# Patient Record
Sex: MALE | Race: WHITE | NOT HISPANIC OR LATINO | Employment: FULL TIME | URBAN - METROPOLITAN AREA
[De-identification: names, ages, dates, MRNs, and addresses within clinical notes are randomized per-mention and may not be internally consistent; named-entity substitution may affect disease eponyms.]

---

## 2017-01-23 ENCOUNTER — ALLSCRIPTS OFFICE VISIT (OUTPATIENT)
Dept: OTHER | Facility: OTHER | Age: 57
End: 2017-01-23

## 2017-01-23 LAB
CLARITY UR: NORMAL
COLOR UR: YELLOW
GLUCOSE (HISTORICAL): NORMAL
HGB UR QL STRIP.AUTO: NORMAL
KETONES UR STRIP-MCNC: NORMAL MG/DL
LEUKOCYTE ESTERASE UR QL STRIP: NORMAL
NITRITE UR QL STRIP: NORMAL
PH UR STRIP.AUTO: 7 [PH]
PROT UR STRIP-MCNC: NORMAL MG/DL
SP GR UR STRIP.AUTO: 1

## 2017-07-24 ENCOUNTER — ALLSCRIPTS OFFICE VISIT (OUTPATIENT)
Dept: OTHER | Facility: OTHER | Age: 57
End: 2017-07-24

## 2017-07-24 LAB
CLARITY UR: NORMAL
COLOR UR: YELLOW
GLUCOSE (HISTORICAL): NORMAL
KETONES UR STRIP-MCNC: NORMAL MG/DL
LEUKOCYTE ESTERASE UR QL STRIP: NORMAL
PH UR STRIP.AUTO: 5 [PH]
PROT UR STRIP-MCNC: NORMAL MG/DL
SP GR UR STRIP.AUTO: 1.01
UROBILINOGEN UR QL STRIP.AUTO: NORMAL

## 2018-01-13 VITALS
WEIGHT: 220 LBS | HEART RATE: 79 BPM | DIASTOLIC BLOOD PRESSURE: 80 MMHG | BODY MASS INDEX: 27.35 KG/M2 | SYSTOLIC BLOOD PRESSURE: 138 MMHG | HEIGHT: 75 IN

## 2018-01-15 VITALS
WEIGHT: 223 LBS | HEART RATE: 64 BPM | DIASTOLIC BLOOD PRESSURE: 90 MMHG | BODY MASS INDEX: 27.73 KG/M2 | HEIGHT: 75 IN | SYSTOLIC BLOOD PRESSURE: 130 MMHG

## 2018-01-19 ENCOUNTER — ALLSCRIPTS OFFICE VISIT (OUTPATIENT)
Dept: OTHER | Facility: OTHER | Age: 58
End: 2018-01-19

## 2018-01-20 NOTE — PROGRESS NOTES
Assessment   1  Right inguinal hernia (550 90) (K40 90)    Plan   Right inguinal hernia    · 1 - Emery GAN, Eder Mccann (General Surgery) Co-Management  *  Status: Active     Requested for: 77ACL2486  Care Summary provided  : Yes      I do suspect the patient has a hernia-I have referred him to General surgery for consultation, confirmation of the diagnosis, and likely repair  Discussion/Summary   Possible side effects of new medications were reviewed with the patient/guardian today  The treatment plan was reviewed with the patient/guardian  The patient/guardian understands and agrees with the treatment plan      Chief Complaint   Pt here with what he believes is a hernia  He is just getting over a cough and seems to have some rebound, discomfort in his groin area  History of Present Illness   HPI: here today because he thinks he has a hernia on the left lower abdomen had a bad cough and started to feel a discomfort in the left lower abdomen noticed a bulge is not painful does go in and out has never had a hernia before      Active Problems   1  Benign nodular prostatic hyperplasia without lower urinary tract symptoms (600 10)     (N40 0)   2  Carotid bruit (785 9) (R09 89)   3  Enlarged prostate (600 00) (N40 0)   4  Hypertension (401 9) (I10)    Past Medical History   1  History of hematuria (V13 09) (Z87 448)   2  History of urinary frequency (V13 09) (K77 784)  Active Problems And Past Medical History Reviewed: The active problems and past medical history were reviewed and updated today  Family History   Mother    1  Family history of Hypertension (V17 49)  Father    2  Family history of CABG   3  Family history of cardiac disorder (V17 49) (Z82 49)   4  Family history of Hyperlipidemia  Sister    5  Family history of Breast Cancer (V16 3)  Maternal Grandmother    6  Family history of Diabetes Mellitus (V18 0)  Family History Reviewed: The family history was reviewed and updated today  Social History    · Denied: History of Drug Use   ·    · Never a smoker   · Never A Smoker   · Never Drank Alcohol   · No alcohol use   · No drug use  The social history was reviewed and updated today  Surgical History   Surgical History Reviewed: The surgical history was reviewed and updated today  Current Meds    1  Finasteride 5 MG Oral Tablet; Take 1 tablet daily; Therapy: [id number] to (Evaluate:18Jan2018)  Requested for: 04AFG0817; Last     Rx:23Jan2017 Ordered   2  Oxybutynin Chloride ER 10 MG Oral Tablet Extended Release 24 Hour; TAKE 1 TABLET     DAILY; Therapy: 20ODL5866 to (Evaluate:18Jan2018)  Requested for: 66HXP0194; Last     Rx:23Jan2017 Ordered   3  Tamsulosin HCl - 0 4 MG Oral Capsule; TAKE 1 CAPSULE Bedtime; Therapy: 33PZO6530 to 0688 [id number] 05 65)  Requested for: 83JXH0927; Last     Rx:23Jan2017 Ordered     The medication list was reviewed and updated today  Allergies   1  No Known Drug Allergies  2  No Known Environmental Allergies   3  No Known Food Allergies    Vitals    Recorded: 35LQY3391 01:38PM   Temperature 96 5 F   Heart Rate 72   Systolic 459   Diastolic 88   Height 6 ft 3 in   Weight 218 lb    BMI Calculated 27 25   BSA Calculated 2 28   O2 Saturation 98     Physical Exam        Constitutional      General appearance: No acute distress, well appearing and well nourished  Musculoskeletal      Gait and station: Normal        Additional Exam:  There is a bulge in the right lower abdomen/inguinal area that is more pronounced with cough, no tenderness to palpation, no strangulation, it is reducible  Future Appointments      Date/Time Provider Specialty Site   07/30/2018 07:30 AM PINKY Sr   Urology [address] [place] [address]     Signatures    Electronically signed by : PINKY Whiteside ; Jan 19 2018  1:55PM EST                       (Author)

## 2018-01-22 VITALS
SYSTOLIC BLOOD PRESSURE: 132 MMHG | WEIGHT: 218 LBS | HEART RATE: 72 BPM | HEIGHT: 75 IN | TEMPERATURE: 96.5 F | OXYGEN SATURATION: 98 % | DIASTOLIC BLOOD PRESSURE: 88 MMHG | BODY MASS INDEX: 27.1 KG/M2

## 2018-01-29 ENCOUNTER — OFFICE VISIT (OUTPATIENT)
Dept: SURGERY | Facility: HOSPITAL | Age: 58
End: 2018-01-29
Payer: COMMERCIAL

## 2018-01-29 VITALS
DIASTOLIC BLOOD PRESSURE: 90 MMHG | HEIGHT: 75 IN | SYSTOLIC BLOOD PRESSURE: 148 MMHG | BODY MASS INDEX: 26.11 KG/M2 | WEIGHT: 210 LBS | HEART RATE: 80 BPM | RESPIRATION RATE: 20 BRPM | TEMPERATURE: 97.6 F

## 2018-01-29 DIAGNOSIS — K40.90 RIGHT INGUINAL HERNIA: Primary | ICD-10-CM

## 2018-01-29 DIAGNOSIS — D22.9 MULTIPLE PIGMENTED NEVI: ICD-10-CM

## 2018-01-29 DIAGNOSIS — D18.01 HEMANGIOMA OF SKIN: ICD-10-CM

## 2018-01-29 DIAGNOSIS — K42.9 UMBILICAL HERNIA WITHOUT OBSTRUCTION AND WITHOUT GANGRENE: ICD-10-CM

## 2018-01-29 PROCEDURE — 99203 OFFICE O/P NEW LOW 30 MIN: CPT | Performed by: SURGERY

## 2018-01-29 RX ORDER — FINASTERIDE 5 MG/1
1 TABLET, FILM COATED ORAL DAILY
COMMUNITY
Start: 2016-11-14 | End: 2018-03-19 | Stop reason: SDUPTHER

## 2018-01-29 RX ORDER — TAMSULOSIN HYDROCHLORIDE 0.4 MG/1
1 CAPSULE ORAL
COMMUNITY
Start: 2017-01-23 | End: 2018-03-19 | Stop reason: SDUPTHER

## 2018-01-29 RX ORDER — OXYBUTYNIN CHLORIDE 10 MG/1
1 TABLET, EXTENDED RELEASE ORAL DAILY
COMMUNITY
Start: 2016-11-14 | End: 2018-03-19 | Stop reason: SDUPTHER

## 2018-01-29 NOTE — PROGRESS NOTES
[address]  [facility]  [facility] [address]  1960  [address] [place] [address] SURGICAL Fonnie Hermelinda  [facility]  [address] [address]    Chief Complaint   Patient presents with    Groin Pain     New patient consult for possible right inguinal hernia       Assessment/Plan          No history exists  History of Present Illness: Gabby Pike is a 59year old male who presents today, per referral by Dr Alvin Treadwell, for consultation regarding a possible right inguinal hernia  He noticed the bulge about a week ago  He recently had recent cold with cough and started to have discomfort with lump in right groin  He has not had any past hernia surgeries  No n/v/f/c  No change in bladder or bowels  Last colonoscopy when he was 46  Review of Systems   Constitutional: Negative  Negative for chills and fever  HENT: Negative  Eyes: Negative  Respiratory: Negative  Cardiovascular: Negative  Gastrointestinal: Positive for abdominal pain  Negative for blood in stool, constipation, diarrhea, nausea and vomiting  Endocrine: Negative  Genitourinary: Negative  Musculoskeletal: Negative  Skin: Negative  Allergic/Immunologic: Negative  Neurological: Negative  Hematological: Negative  Psychiatric/Behavioral: Negative  All other systems reviewed and are negative  There is no problem list on file for this patient  Past Medical History:   Diagnosis Date    BPH (benign prostatic hyperplasia)     Carotid bruit     Hypertension      No past surgical history on file  No family history on file  Social History     Social History    Marital status: /Civil Union     Spouse name: N/A    Number of children: N/A    Years of education: N/A     Occupational History    Not on file       Social History Main Topics  Smoking status: Not on file    Smokeless tobacco: Not on file    Alcohol use Not on file    Drug use: Unknown    Sexual activity: Not on file     Other Topics Concern    Not on file     Social History Narrative    No narrative on file       Current Outpatient Prescriptions:     finasteride (PROSCAR) 5 mg tablet, Take 1 tablet by mouth daily, Disp: , Rfl:     oxybutynin (DITROPAN-XL) 10 MG 24 hr tablet, Take 1 tablet by mouth daily, Disp: , Rfl:     tamsulosin (FLOMAX) 0 4 mg, Take 1 capsule by mouth, Disp: , Rfl:   No Known Allergies  Vitals:    01/29/18 1450   BP: 148/90   Pulse: 80   Resp: 20   Temp: 97 6 °F (36 4 °C)       Physical Exam   Constitutional: He is oriented to person, place, and time  He appears well-developed and well-nourished  No distress  HENT:   Head: Normocephalic and atraumatic  Right Ear: External ear normal    Left Ear: External ear normal    Nose: Nose normal    Mouth/Throat: Oropharynx is clear and moist  No oropharyngeal exudate  Eyes: Conjunctivae and EOM are normal  Right eye exhibits no discharge  Left eye exhibits no discharge  No scleral icterus  Neck: Normal range of motion  Neck supple  No JVD present  No tracheal deviation present  No thyromegaly present  Cardiovascular: Normal rate, regular rhythm, normal heart sounds and intact distal pulses  Exam reveals no gallop and no friction rub  No murmur heard  Pulmonary/Chest: Effort normal and breath sounds normal  No stridor  No respiratory distress  He has no wheezes  He has no rales  He exhibits no tenderness  Abdominal: Soft  Bowel sounds are normal  He exhibits no distension and no mass  There is no tenderness  There is no rebound and no guarding  Umbilical hernia, right inguinal hernia, left inguinal weakness (possible hernia)  Musculoskeletal: Normal range of motion  He exhibits no edema, tenderness or deformity  Lymphadenopathy:     He has no cervical adenopathy     Neurological: He is alert and oriented to person, place, and time  No cranial nerve deficit  Coordination normal    Skin: Skin is dry  No rash noted  He is not diaphoretic  No erythema  No pallor  Pigmented nevi and scattered hemangiomas of back  Psychiatric: He has a normal mood and affect  His behavior is normal  Thought content normal    Nursing note and vitals reviewed  Pathology:  * Cannot find OR log *      Labs:  CBC, Coags, BMP, Mg, Phos     Imaging  No results found  I reviewed the above laboratory and imaging data  Discussion/Summary: Ygnacio Claude is a 59year old male who presents today, per referral by Dr Kelley Denis, for consultation regarding a possible right inguinal hernia  Physical exam revealed an umbilical hernia, right inguinal hernia, and left inguinal weakness (possible hernia)  Discussed risks, benefits, and alternatives to laparoscopic right inguinal hernia repair with mesh and umbilical hernia repair with or without mesh  Will check the left groin as well for a possible hernia  Explained the procedure, as well as pre- and post-operative protocol and restrictions  No heavy lifting greater than 25 pounds for the first two weeks after surgery, and no heavy lifting greater than 15 pounds for the third and fourth weeks  No strenuous activity during weeks 1 and 2, may resume light cardio activity in weeks 3 and 4  He will schedule surgery for his earliest convenience  He knows to call if any questions or concerns arise  Skin- Encouraged him to have an annual skin exam performed by his PCP or a dermatologist for pigmented nevi and scattered hemangiomas of back

## 2018-03-19 DIAGNOSIS — N40.0 BENIGN PROSTATIC HYPERPLASIA, UNSPECIFIED WHETHER LOWER URINARY TRACT SYMPTOMS PRESENT: Primary | ICD-10-CM

## 2018-03-19 RX ORDER — TAMSULOSIN HYDROCHLORIDE 0.4 MG/1
CAPSULE ORAL
Qty: 90 CAPSULE | Refills: 3 | Status: SHIPPED | OUTPATIENT
Start: 2018-03-19 | End: 2020-01-17 | Stop reason: ALTCHOICE

## 2018-03-19 RX ORDER — FINASTERIDE 5 MG/1
TABLET, FILM COATED ORAL
Qty: 90 TABLET | Refills: 2 | Status: SHIPPED | OUTPATIENT
Start: 2018-03-19 | End: 2020-01-17 | Stop reason: ALTCHOICE

## 2018-03-19 RX ORDER — OXYBUTYNIN CHLORIDE 10 MG/1
TABLET, EXTENDED RELEASE ORAL
Qty: 90 TABLET | Refills: 2 | Status: SHIPPED | OUTPATIENT
Start: 2018-03-19 | End: 2020-01-17 | Stop reason: ALTCHOICE

## 2018-04-09 ENCOUNTER — OFFICE VISIT (OUTPATIENT)
Dept: SURGERY | Facility: HOSPITAL | Age: 58
End: 2018-04-09
Payer: COMMERCIAL

## 2018-04-09 VITALS
SYSTOLIC BLOOD PRESSURE: 120 MMHG | HEART RATE: 76 BPM | DIASTOLIC BLOOD PRESSURE: 78 MMHG | RESPIRATION RATE: 16 BRPM | TEMPERATURE: 97.1 F | HEIGHT: 75 IN | BODY MASS INDEX: 27.3 KG/M2 | WEIGHT: 219.6 LBS

## 2018-04-09 DIAGNOSIS — K42.9 UMBILICAL HERNIA WITHOUT OBSTRUCTION OR GANGRENE: ICD-10-CM

## 2018-04-09 DIAGNOSIS — K42.9 UMBILICAL HERNIA WITHOUT OBSTRUCTION AND WITHOUT GANGRENE: ICD-10-CM

## 2018-04-09 DIAGNOSIS — K40.90 UNILATERAL INGUINAL HERNIA WITHOUT OBSTRUCTION OR GANGRENE, RECURRENCE NOT SPECIFIED: Primary | ICD-10-CM

## 2018-04-09 DIAGNOSIS — K40.20 BILATERAL INGUINAL HERNIA WITHOUT OBSTRUCTION OR GANGRENE, RECURRENCE NOT SPECIFIED: ICD-10-CM

## 2018-04-09 PROCEDURE — 99213 OFFICE O/P EST LOW 20 MIN: CPT | Performed by: SURGERY

## 2018-04-09 NOTE — PROGRESS NOTES
Assessment/Plan: Jada Esparza is a 59year old male who presents today for re-evalaution of RIH, possible LIH and umbilical hernias  Physical exam revealed umbilical hernia, right inguinal hernia, and left-sided weakness  Discussed risks, benefits, and alternatives to laparoscopic right inguinal hernia repair (and possible left inguinal hernia repair) with mesh and umbilical hernia repair with or without mesh  Explained the procedure, as well as pre- and post-surgical protocol and restrictions  No heavy lifting greater than 15 pounds for the first 2 weeks and no strenuous activity  No heavy lifting greater than 25 pounds weeks 3-4 with light cardiovascular activity permissible  Will attempt to schedule for surgery at the same time as Dr Nancy Che surgery  He knows to call if any questions or concerns arise  No problem-specific Assessment & Plan notes found for this encounter  There are no diagnoses linked to this encounter  Subjective:      Patient ID: Steve Sanchez is a 59 y o  male  Jada Esparza is a 59year old male who presents today for re-evalaution of RIH, possible LIH and umbilical hernias  He would also like to discuss having prostate surgery in the same time period  The following portions of the patient's history were reviewed and updated as appropriate: allergies, current medications, past family history, past medical history, past social history, past surgical history and problem list     Review of Systems   Constitutional: Negative  HENT: Negative  Eyes: Negative  Respiratory: Negative  Cardiovascular: Negative  Gastrointestinal: Positive for abdominal pain  Endocrine: Negative  Genitourinary: Negative  Musculoskeletal: Negative  Skin: Negative  Allergic/Immunologic: Negative  Neurological: Negative  Hematological: Negative  Psychiatric/Behavioral: Negative  All other systems reviewed and are negative  Objective:      /78 (BP Location: Left arm, Patient Position: Sitting, Cuff Size: Standard)   Pulse 76   Temp (!) 97 1 °F (36 2 °C) (Tympanic)   Resp 16   Ht 6' 3" (1 905 m)   Wt 99 6 kg (219 lb 9 6 oz)   BMI 27 45 kg/m²          Physical Exam   Constitutional: He is oriented to person, place, and time  He appears well-developed and well-nourished  No distress  HENT:   Head: Normocephalic and atraumatic  Right Ear: External ear normal    Left Ear: External ear normal    Nose: Nose normal    Mouth/Throat: Oropharynx is clear and moist  No oropharyngeal exudate  Eyes: Conjunctivae and EOM are normal  Right eye exhibits no discharge  Left eye exhibits no discharge  No scleral icterus  Neck: Normal range of motion  Neck supple  No JVD present  No tracheal deviation present  No thyromegaly present  Cardiovascular: Normal rate, regular rhythm, normal heart sounds and intact distal pulses  Exam reveals no gallop and no friction rub  No murmur heard  Pulmonary/Chest: Effort normal and breath sounds normal  No stridor  No respiratory distress  He has no wheezes  He has no rales  He exhibits no tenderness  Abdominal: Soft  Bowel sounds are normal  He exhibits no distension and no mass  There is no tenderness  There is no rebound and no guarding  Umbilical hernia, right inguinal hernia, left-sided weakness  Musculoskeletal: Normal range of motion  He exhibits no edema, tenderness or deformity  Lymphadenopathy:     He has no cervical adenopathy  Neurological: He is alert and oriented to person, place, and time  No cranial nerve deficit  Coordination normal    Skin: Skin is dry  No rash noted  He is not diaphoretic  No erythema  No pallor  Psychiatric: He has a normal mood and affect  His behavior is normal  Thought content normal    Nursing note and vitals reviewed          By signing my name below, Skyla Martinez, attest that this documentation has been prepared under the direction and in the presence of Dr Mae Wallis MD  Electronically signed: Padmini Cardenas, Medical Scribe  4/9/18  Millicent Bland, personally performed the services described in this documentation  All medical record entries made by the scribe were at my direction and in my presence  I have reviewed the chart and agree that the record reflects my personal performance and is accurate and complete  Dr Mae Wallis MD  4/9/18

## 2018-04-09 NOTE — LETTER
April 10, 2018     Carl Fuller, [address] [facility] [address]    Patient: Kirstie Venegas   YOB: 1960   Date of Visit: 4/9/2018       Dear Dr Mili Choi Recipients: Thank you for referring Shashank Henderson to me for evaluation  Below are my notes for this consultation  If you have questions, please do not hesitate to call me  I look forward to following your patient along with you           Sincerely,        Olegario Kruger MD        CC: No Recipients

## 2018-04-10 RX ORDER — SODIUM CHLORIDE, SODIUM LACTATE, POTASSIUM CHLORIDE, CALCIUM CHLORIDE 600; 310; 30; 20 MG/100ML; MG/100ML; MG/100ML; MG/100ML
125 INJECTION, SOLUTION INTRAVENOUS CONTINUOUS
Status: CANCELLED | OUTPATIENT
Start: 2018-04-24

## 2018-04-10 RX ORDER — LEVOFLOXACIN 5 MG/ML
750 INJECTION, SOLUTION INTRAVENOUS ONCE
Status: CANCELLED | OUTPATIENT
Start: 2018-04-24 | End: 2018-04-24

## 2018-04-16 ENCOUNTER — HOSPITAL ENCOUNTER (OUTPATIENT)
Dept: NON INVASIVE DIAGNOSTICS | Facility: HOSPITAL | Age: 58
Discharge: HOME | End: 2018-04-16
Attending: SURGERY
Payer: COMMERCIAL

## 2018-04-16 ENCOUNTER — APPOINTMENT (OUTPATIENT)
Dept: LAB | Facility: HOSPITAL | Age: 58
End: 2018-04-16
Attending: SURGERY
Payer: COMMERCIAL

## 2018-04-16 ENCOUNTER — HOSPITAL ENCOUNTER (OUTPATIENT)
Dept: RADIOLOGY | Facility: HOSPITAL | Age: 58
Discharge: HOME | End: 2018-04-16
Attending: SURGERY
Payer: COMMERCIAL

## 2018-04-16 DIAGNOSIS — K40.90 UNILATERAL INGUINAL HERNIA WITHOUT OBSTRUCTION OR GANGRENE, RECURRENCE NOT SPECIFIED: ICD-10-CM

## 2018-04-16 LAB
ANION GAP SERPL CALCULATED.3IONS-SCNC: 10 MMOL/L (ref 4–13)
BASOPHILS # BLD AUTO: 0.01 THOUSANDS/ΜL (ref 0–0.1)
BASOPHILS NFR BLD AUTO: 0 % (ref 0–1)
BILIRUB UR QL STRIP: NEGATIVE
BUN SERPL-MCNC: 26 MG/DL (ref 5–25)
CALCIUM SERPL-MCNC: 8.5 MG/DL
CHLORIDE SERPL-SCNC: 106 MMOL/L (ref 100–108)
CLARITY UR: CLEAR
CO2 SERPL-SCNC: 27 MMOL/L (ref 21–32)
COLOR UR: YELLOW
CREAT SERPL-MCNC: 0.83 MG/DL (ref 0.6–1.3)
EOSINOPHIL # BLD AUTO: 0.06 THOUSAND/ΜL (ref 0–0.61)
EOSINOPHIL NFR BLD AUTO: 1 % (ref 0–6)
ERYTHROCYTE [DISTWIDTH] IN BLOOD BY AUTOMATED COUNT: 12.5 % (ref 11.6–15.1)
EST. AVERAGE GLUCOSE BLD GHB EST-MCNC: 103 MG/DL
GFR SERPL CREATININE-BSD FRML MDRD: 96 ML/MIN/1.73SQ M
GLUCOSE SERPL-MCNC: 96 MG/DL (ref 65–140)
GLUCOSE UR STRIP-MCNC: NEGATIVE MG/DL
HBA1C MFR BLD: 5.2 % (ref 4.2–6.3)
HCT VFR BLD AUTO: 46.1 % (ref 36.5–49.3)
HGB BLD-MCNC: 16 G/DL (ref 12–17)
HGB UR QL STRIP.AUTO: NEGATIVE
KETONES UR STRIP-MCNC: NEGATIVE MG/DL
LEUKOCYTE ESTERASE UR QL STRIP: NEGATIVE
LYMPHOCYTES # BLD AUTO: 1.49 THOUSANDS/ΜL (ref 0.6–4.47)
LYMPHOCYTES NFR BLD AUTO: 26 % (ref 14–44)
MCH RBC QN AUTO: 31.6 PG (ref 26.8–34.3)
MCHC RBC AUTO-ENTMCNC: 34.7 G/DL (ref 31.4–37.4)
MCV RBC AUTO: 91 FL (ref 82–98)
MONOCYTES # BLD AUTO: 0.38 THOUSAND/ΜL (ref 0.17–1.22)
MONOCYTES NFR BLD AUTO: 7 % (ref 4–12)
NEUTROPHILS # BLD AUTO: 3.8 THOUSANDS/ΜL (ref 1.85–7.62)
NEUTS SEG NFR BLD AUTO: 66 % (ref 43–75)
NITRITE UR QL STRIP: NEGATIVE
PH UR STRIP.AUTO: 6.5 [PH] (ref 4.5–8)
PLATELET # BLD AUTO: 167 THOUSANDS/UL (ref 149–390)
PMV BLD AUTO: 11.1 FL (ref 8.9–12.7)
POTASSIUM SERPL-SCNC: 3.8 MMOL/L (ref 3.5–5.3)
PROT UR STRIP-MCNC: NEGATIVE MG/DL
RBC # BLD AUTO: 5.06 MILLION/UL (ref 3.88–5.62)
SODIUM SERPL-SCNC: 143 MMOL/L (ref 136–145)
SP GR UR STRIP.AUTO: 1.02 (ref 1–1.03)
UROBILINOGEN UR QL STRIP.AUTO: 0.2 E.U./DL
WBC # BLD AUTO: 5.74 THOUSAND/UL (ref 4.31–10.16)

## 2018-04-16 PROCEDURE — 83036 HEMOGLOBIN GLYCOSYLATED A1C: CPT

## 2018-04-16 PROCEDURE — 81003 URINALYSIS AUTO W/O SCOPE: CPT | Performed by: SURGERY

## 2018-04-16 PROCEDURE — 71046 X-RAY EXAM CHEST 2 VIEWS: CPT

## 2018-04-16 PROCEDURE — 36415 COLL VENOUS BLD VENIPUNCTURE: CPT

## 2018-04-16 PROCEDURE — 93005 ELECTROCARDIOGRAM TRACING: CPT | Performed by: SURGERY

## 2018-04-16 PROCEDURE — 80048 BASIC METABOLIC PNL TOTAL CA: CPT

## 2018-04-16 PROCEDURE — 85025 COMPLETE CBC W/AUTO DIFF WBC: CPT

## 2018-04-16 RX ORDER — MULTIVITAMIN
1 CAPSULE ORAL DAILY
COMMUNITY

## 2018-04-16 NOTE — PRE-PROCEDURE INSTRUCTIONS
Pre-Surgery Instructions:   Medication Instructions    finasteride (PROSCAR) 5 mg tablet Instructed patient per Anesthesia Guidelines   Multiple Vitamin (MULTIVITAMIN) capsule Instructed patient per Anesthesia Guidelines   oxybutynin (DITROPAN-XL) 10 MG 24 hr tablet Instructed patient per Anesthesia Guidelines   tamsulosin (FLOMAX) 0 4 mg Instructed patient per Anesthesia Guidelines  Before your operation, you play an important role in decreasing your risk for infection by washing with special antiseptic soap  This is an effective way to reduce bacteria on the skin which may help to prevent infections at the surgical site  Please read the following directions in advance  1  In the week before your operation purchase a 4 ounce bottle of antiseptic soap containing chlorhexidine gluconate 4%  Some brand names include: Aplicare, Endure, and Hibiclens  The cost is usually less than $5 00  · For your convenience, the [address] [place] [address] carries the soap  · It may also be available at your doctor's office or pre-admission testing center, and at most retail pharmacies  · If you are allergic or sensitive to soaps containing chlorhexidine gluconate (CHG), please let your doctor know so another antiseptic soap can be suggested  · CHG antiseptic soap is for external use only  2      The day before your operation follow these directions carefully to get ready  · Place clean lines (sheets) on your bed; you should sleep on clean sheets after your evening shower  · Get clean towels and washcloths ready - you need enough for 2 showers  · Set aside clean underwear, pajamas, and clothing to wear after the shower  Reminders:  · DO NOT use any other soap or body rinse on your skin during or after the antiseptic showers  · DO NOT use lotion , powder, deodorant, or perfume/aftershave of any kind on your skin after your antiseptic shower    · DO NOT shave any body parts in the 24 hours/the day before your operation  · DO NOT get the antiseptic soap in your eyes, ears, nose, mouth, or vaginal area  3      You will need to shower the night before AND the morning of your Surgery  Shower 1:  · The evening before your operation, take the fist shower  · First, shampoo your hair with regular shampoo and rinse it completely before you use the anitseptic soap  After washing and rinsing your hair, rinse your body  · Next, use a clean wash cloth to apply the antiseptic soap and wash your body from the neck down to your toes using 1/2 bottle of the antiseptic soap  You will use the other 1/2 bottle for the second shower  · Clean the area where your incision will be; later this area well for about 2 minutes  · If you ar having head or neck surgery, wash areas with the antiseptic soap  · Rinse yourself completely with running water  · Use a clean towel to dry off  · Wear clean underwear and clothing/pajamas  Shower 2:  · The Morning of your operation, take the second shower following the same steps as Shower 1 using the second 1/2 of the bottle of antiseptic soap  · Use clean cloths and towels to was and dry yourself off  · Wear clean underwear and clothing

## 2018-04-17 LAB
ATRIAL RATE: 79 BPM
P AXIS: 67 DEGREES
PR INTERVAL: 152 MS
QRS AXIS: 69 DEGREES
QRSD INTERVAL: 80 MS
QT INTERVAL: 382 MS
QTC INTERVAL: 438 MS
T WAVE AXIS: 46 DEGREES
VENTRICULAR RATE: 79 BPM

## 2018-04-17 PROCEDURE — 93010 ELECTROCARDIOGRAM REPORT: CPT | Performed by: INTERNAL MEDICINE

## 2018-04-24 ENCOUNTER — HOSPITAL ENCOUNTER (OUTPATIENT)
Facility: HOSPITAL | Age: 58
Setting detail: OUTPATIENT SURGERY
Discharge: HOME | End: 2018-04-24
Attending: SURGERY | Admitting: SURGERY
Payer: COMMERCIAL

## 2018-04-24 ENCOUNTER — ANESTHESIA (OUTPATIENT)
Dept: PERIOP | Facility: HOSPITAL | Age: 58
End: 2018-04-24
Payer: COMMERCIAL

## 2018-04-24 ENCOUNTER — ANESTHESIA EVENT (OUTPATIENT)
Dept: PERIOP | Facility: HOSPITAL | Age: 58
End: 2018-04-24
Payer: COMMERCIAL

## 2018-04-24 VITALS
DIASTOLIC BLOOD PRESSURE: 75 MMHG | SYSTOLIC BLOOD PRESSURE: 133 MMHG | TEMPERATURE: 98.2 F | HEART RATE: 82 BPM | BODY MASS INDEX: 26.49 KG/M2 | WEIGHT: 213 LBS | OXYGEN SATURATION: 96 % | RESPIRATION RATE: 18 BRPM | HEIGHT: 75 IN

## 2018-04-24 DIAGNOSIS — K42.9 UMBILICAL HERNIA WITHOUT OBSTRUCTION OR GANGRENE: Primary | ICD-10-CM

## 2018-04-24 DIAGNOSIS — K40.90 UNILATERAL INGUINAL HERNIA WITHOUT OBSTRUCTION OR GANGRENE, RECURRENCE NOT SPECIFIED: ICD-10-CM

## 2018-04-24 PROCEDURE — 49650 LAP ING HERNIA REPAIR INIT: CPT | Performed by: PHYSICIAN ASSISTANT

## 2018-04-24 PROCEDURE — 49650 LAP ING HERNIA REPAIR INIT: CPT | Performed by: SURGERY

## 2018-04-24 PROCEDURE — C1781 MESH (IMPLANTABLE): HCPCS | Performed by: SURGERY

## 2018-04-24 DEVICE — LAPAROSCOPIC SELF-FIXATING MESH, LEFT AND RIGHT ANATOMICAL
Type: IMPLANTABLE DEVICE | Site: ABDOMEN | Status: FUNCTIONAL
Brand: PROGRIP

## 2018-04-24 RX ORDER — LEVOFLOXACIN 5 MG/ML
750 INJECTION, SOLUTION INTRAVENOUS ONCE
Status: COMPLETED | OUTPATIENT
Start: 2018-04-24 | End: 2018-04-24

## 2018-04-24 RX ORDER — PROPOFOL 10 MG/ML
INJECTION, EMULSION INTRAVENOUS AS NEEDED
Status: DISCONTINUED | OUTPATIENT
Start: 2018-04-24 | End: 2018-04-24 | Stop reason: SURG

## 2018-04-24 RX ORDER — ACETAMINOPHEN 325 MG/1
650 TABLET ORAL EVERY 6 HOURS PRN
Status: DISCONTINUED | OUTPATIENT
Start: 2018-04-24 | End: 2018-04-24 | Stop reason: HOSPADM

## 2018-04-24 RX ORDER — HYDROCODONE BITARTRATE AND ACETAMINOPHEN 5; 325 MG/1; MG/1
1-2 TABLET ORAL EVERY 4 HOURS PRN
Qty: 30 TABLET | Refills: 0 | Status: SHIPPED | OUTPATIENT
Start: 2018-04-24 | End: 2018-05-04

## 2018-04-24 RX ORDER — MAGNESIUM HYDROXIDE 1200 MG/15ML
LIQUID ORAL AS NEEDED
Status: DISCONTINUED | OUTPATIENT
Start: 2018-04-24 | End: 2018-04-24 | Stop reason: HOSPADM

## 2018-04-24 RX ORDER — GLYCOPYRROLATE 0.2 MG/ML
INJECTION INTRAMUSCULAR; INTRAVENOUS AS NEEDED
Status: DISCONTINUED | OUTPATIENT
Start: 2018-04-24 | End: 2018-04-24 | Stop reason: SURG

## 2018-04-24 RX ORDER — MIDAZOLAM HYDROCHLORIDE 1 MG/ML
INJECTION INTRAMUSCULAR; INTRAVENOUS AS NEEDED
Status: DISCONTINUED | OUTPATIENT
Start: 2018-04-24 | End: 2018-04-24 | Stop reason: SURG

## 2018-04-24 RX ORDER — SODIUM CHLORIDE, SODIUM LACTATE, POTASSIUM CHLORIDE, CALCIUM CHLORIDE 600; 310; 30; 20 MG/100ML; MG/100ML; MG/100ML; MG/100ML
125 INJECTION, SOLUTION INTRAVENOUS CONTINUOUS
Status: DISCONTINUED | OUTPATIENT
Start: 2018-04-24 | End: 2018-04-24 | Stop reason: HOSPADM

## 2018-04-24 RX ORDER — HYDROCODONE BITARTRATE AND ACETAMINOPHEN 5; 325 MG/1; MG/1
2 TABLET ORAL EVERY 4 HOURS PRN
Status: DISCONTINUED | OUTPATIENT
Start: 2018-04-24 | End: 2018-04-24 | Stop reason: HOSPADM

## 2018-04-24 RX ORDER — FENTANYL CITRATE/PF 50 MCG/ML
25 SYRINGE (ML) INJECTION
Status: DISCONTINUED | OUTPATIENT
Start: 2018-04-24 | End: 2018-04-24 | Stop reason: HOSPADM

## 2018-04-24 RX ORDER — ONDANSETRON 2 MG/ML
4 INJECTION INTRAMUSCULAR; INTRAVENOUS EVERY 6 HOURS PRN
Status: DISCONTINUED | OUTPATIENT
Start: 2018-04-24 | End: 2018-04-24 | Stop reason: HOSPADM

## 2018-04-24 RX ORDER — KETOROLAC TROMETHAMINE 30 MG/ML
INJECTION, SOLUTION INTRAMUSCULAR; INTRAVENOUS AS NEEDED
Status: DISCONTINUED | OUTPATIENT
Start: 2018-04-24 | End: 2018-04-24 | Stop reason: SURG

## 2018-04-24 RX ORDER — ROCURONIUM BROMIDE 10 MG/ML
INJECTION, SOLUTION INTRAVENOUS AS NEEDED
Status: DISCONTINUED | OUTPATIENT
Start: 2018-04-24 | End: 2018-04-24 | Stop reason: SURG

## 2018-04-24 RX ORDER — FENTANYL CITRATE 50 UG/ML
INJECTION, SOLUTION INTRAMUSCULAR; INTRAVENOUS AS NEEDED
Status: DISCONTINUED | OUTPATIENT
Start: 2018-04-24 | End: 2018-04-24 | Stop reason: SURG

## 2018-04-24 RX ORDER — ONDANSETRON 2 MG/ML
INJECTION INTRAMUSCULAR; INTRAVENOUS AS NEEDED
Status: DISCONTINUED | OUTPATIENT
Start: 2018-04-24 | End: 2018-04-24 | Stop reason: SURG

## 2018-04-24 RX ADMIN — ONDANSETRON 4 MG: 2 INJECTION INTRAMUSCULAR; INTRAVENOUS at 12:32

## 2018-04-24 RX ADMIN — DEXAMETHASONE SODIUM PHOSPHATE 4 MG: 10 INJECTION INTRAMUSCULAR; INTRAVENOUS at 12:32

## 2018-04-24 RX ADMIN — MIDAZOLAM HYDROCHLORIDE 2 MG: 1 INJECTION, SOLUTION INTRAMUSCULAR; INTRAVENOUS at 11:57

## 2018-04-24 RX ADMIN — FENTANYL CITRATE 50 MCG: 50 INJECTION, SOLUTION INTRAMUSCULAR; INTRAVENOUS at 12:29

## 2018-04-24 RX ADMIN — GLYCOPYRROLATE 0.4 MG: 0.2 INJECTION, SOLUTION INTRAMUSCULAR; INTRAVENOUS at 13:04

## 2018-04-24 RX ADMIN — LEVOFLOXACIN: 5 INJECTION, SOLUTION INTRAVENOUS at 12:07

## 2018-04-24 RX ADMIN — NEOSTIGMINE METHYLSULFATE 3 MG: 1 INJECTION, SOLUTION INTRAMUSCULAR; INTRAVENOUS; SUBCUTANEOUS at 13:04

## 2018-04-24 RX ADMIN — METRONIDAZOLE 500 MG: 500 INJECTION, SOLUTION INTRAVENOUS at 12:12

## 2018-04-24 RX ADMIN — FENTANYL CITRATE 100 MCG: 50 INJECTION, SOLUTION INTRAMUSCULAR; INTRAVENOUS at 11:57

## 2018-04-24 RX ADMIN — KETOROLAC TROMETHAMINE 15 MG: 30 INJECTION, SOLUTION INTRAMUSCULAR at 12:34

## 2018-04-24 RX ADMIN — PROPOFOL 200 MG: 10 INJECTION, EMULSION INTRAVENOUS at 11:58

## 2018-04-24 RX ADMIN — SODIUM CHLORIDE, SODIUM LACTATE, POTASSIUM CHLORIDE, AND CALCIUM CHLORIDE 125 ML/HR: .6; .31; .03; .02 INJECTION, SOLUTION INTRAVENOUS at 10:07

## 2018-04-24 RX ADMIN — FENTANYL CITRATE 50 MCG: 50 INJECTION, SOLUTION INTRAMUSCULAR; INTRAVENOUS at 12:11

## 2018-04-24 RX ADMIN — ROCURONIUM BROMIDE 50 MG: 10 INJECTION INTRAVENOUS at 11:58

## 2018-04-24 NOTE — ANESTHESIA PREPROCEDURE EVALUATION
Review of Systems/Medical History  Patient summary reviewed  Chart reviewed      Cardiovascular  EKG reviewed,    Pulmonary       GI/Hepatic            Endo/Other     GYN       Hematology   Musculoskeletal       Neurology   Psychology           Physical Exam    Airway    Mallampati score: II  TM Distance: >3 FB  Neck ROM: full     Dental   No notable dental hx     Cardiovascular  Rhythm: regular, Rate: normal, Cardiovascular exam normal    Pulmonary  Pulmonary exam normal Breath sounds clear to auscultation,     Other Findings        Anesthesia Plan  ASA Score- 2     Anesthesia Type- general with ASA Monitors  Additional Monitors:   Airway Plan: ETT  Comment: Benefits and risks of planned anesthetic discussed with patient, and agrees to proceed  I, Dr Mirian Tejeda, the attending anesthesiologist, have personally seen and evaluated the patient prior to anesthetic care  I have reviewed the preanesthetic record, and other medical records if appropriate to the anesthetic care  If a CRNA is involved in the case, I have reviewed the CRNA assessment, if present, and agree  The patient is in a suitable condition to proceed with my formulated anesthetic plan        Plan Factors-    Induction- intravenous  Postoperative Plan- Plan for postoperative opioid use  Informed Consent- Anesthetic plan and risks discussed with patient

## 2018-04-24 NOTE — H&P (VIEW-ONLY)
Assessment/Plan: Gianna Roberts is a 59year old male who presents today for re-evalaution of RIH, possible LIH and umbilical hernias  Physical exam revealed umbilical hernia, right inguinal hernia, and left-sided weakness  Discussed risks, benefits, and alternatives to laparoscopic right inguinal hernia repair (and possible left inguinal hernia repair) with mesh and umbilical hernia repair with or without mesh  Explained the procedure, as well as pre- and post-surgical protocol and restrictions  No heavy lifting greater than 15 pounds for the first 2 weeks and no strenuous activity  No heavy lifting greater than 25 pounds weeks 3-4 with light cardiovascular activity permissible  Will attempt to schedule for surgery at the same time as Dr Luis Chua surgery  He knows to call if any questions or concerns arise  No problem-specific Assessment & Plan notes found for this encounter  There are no diagnoses linked to this encounter  Subjective:      Patient ID: Marian Mack is a 59 y o  male  Gianna Roberts is a 59year old male who presents today for re-evalaution of RIH, possible LIH and umbilical hernias  He would also like to discuss having prostate surgery in the same time period  The following portions of the patient's history were reviewed and updated as appropriate: allergies, current medications, past family history, past medical history, past social history, past surgical history and problem list     Review of Systems   Constitutional: Negative  HENT: Negative  Eyes: Negative  Respiratory: Negative  Cardiovascular: Negative  Gastrointestinal: Positive for abdominal pain  Endocrine: Negative  Genitourinary: Negative  Musculoskeletal: Negative  Skin: Negative  Allergic/Immunologic: Negative  Neurological: Negative  Hematological: Negative  Psychiatric/Behavioral: Negative  All other systems reviewed and are negative  Objective:      /78 (BP Location: Left arm, Patient Position: Sitting, Cuff Size: Standard)   Pulse 76   Temp (!) 97 1 °F (36 2 °C) (Tympanic)   Resp 16   Ht 6' 3" (1 905 m)   Wt 99 6 kg (219 lb 9 6 oz)   BMI 27 45 kg/m²          Physical Exam   Constitutional: He is oriented to person, place, and time  He appears well-developed and well-nourished  No distress  HENT:   Head: Normocephalic and atraumatic  Right Ear: External ear normal    Left Ear: External ear normal    Nose: Nose normal    Mouth/Throat: Oropharynx is clear and moist  No oropharyngeal exudate  Eyes: Conjunctivae and EOM are normal  Right eye exhibits no discharge  Left eye exhibits no discharge  No scleral icterus  Neck: Normal range of motion  Neck supple  No JVD present  No tracheal deviation present  No thyromegaly present  Cardiovascular: Normal rate, regular rhythm, normal heart sounds and intact distal pulses  Exam reveals no gallop and no friction rub  No murmur heard  Pulmonary/Chest: Effort normal and breath sounds normal  No stridor  No respiratory distress  He has no wheezes  He has no rales  He exhibits no tenderness  Abdominal: Soft  Bowel sounds are normal  He exhibits no distension and no mass  There is no tenderness  There is no rebound and no guarding  Umbilical hernia, right inguinal hernia, left-sided weakness  Musculoskeletal: Normal range of motion  He exhibits no edema, tenderness or deformity  Lymphadenopathy:     He has no cervical adenopathy  Neurological: He is alert and oriented to person, place, and time  No cranial nerve deficit  Coordination normal    Skin: Skin is dry  No rash noted  He is not diaphoretic  No erythema  No pallor  Psychiatric: He has a normal mood and affect  His behavior is normal  Thought content normal    Nursing note and vitals reviewed          By signing my name below, Pepe Luo, attest that this documentation has been prepared under the direction and in the presence of Dr Lety Mtz MD  Electronically signed: Jaimee Licona, Medical Scribe  4/9/18  Jose A Morgan, personally performed the services described in this documentation  All medical record entries made by the scribe were at my direction and in my presence  I have reviewed the chart and agree that the record reflects my personal performance and is accurate and complete  Dr Lety Mtz MD  4/9/18

## 2018-04-24 NOTE — OP NOTE
Inguinal Hernia, Laparocopic, Procedure Note    Name: Fabio Quezada   : 1960  MRN: [id number]  Date: 2018    Indications: The patient presented with a history of a right, reducible inguinal hernia and umbilical hernia    Pre-operative Diagnosis: right reducible inguinal hernia and umbilical hernia    Post-operative Diagnosis: bilateral reducible direct and/or indirect inguinal hernia and umbilical hernia    Procedure: Laparoscopic repair of bilateral inguinal hernia with mesh, Laparoscopic assisted bilateral TAP block, open umbilical hernia repair (primary)    Surgeon: Cinthya Jeffrey MD  Assistants: Kaveh Alexandra PA-C, no qualified resident available  PA was medically necessary for the surgical safety of the case, including suturing, retraction, hemostasis  Anesthesia: General endotracheal anesthesia    Procedure Details   The patient was seen again in the Holding Room  The risks, benefits, complications, treatment options, and expected outcomes were discussed with the patient  The possibilities of reaction to medication, pulmonary aspiration, perforation of viscus, bleeding, postoperative short or long term nerve entrapment causing pain,recurrent infection, the need for additional procedures, and development of a complication requiring transfusion or further operation were discussed with the patient and/or family  There was concurrence with the proposed plan, and informed consent was obtained  The site of surgery was properly noted/marked  The patient was taken to the Operating Room, identified as Fabio Quezada and the procedure verified as hernia repair  A Time Out was held and the above information confirmed  The patient was prepped and draped in a sterile fashion  A timeout was again performed  Local anesthesia was used in the incision   An umbilical incision was made and a Elige Valladares was passed around the umbilical stalk and this was  from underlying umbilical hernia sac using cautery  A mynor trocar was inserted into the abdomen via the umbilical hernia defect and the abdomen was insufflated to appropriate pressure  Two additional 5mm trocars were placed lateral to rectus muscle approximately at the level of the umbilicus  At this point the patient was placed into Trendelenburg position and noted to have bilateral inguinal hernia   A laparoscopic assisted bilateral TAP block was performed using a combination of lidocaine and marcaine  The peritoneum was incised from the medial umbilical fold out laterally past the internal ring on the right  Using peanut the superior flap was dissected  Next the direct space was mobilized by exposing Jayro's ligament all the way along its length to the pubic tubercle  If a direct hernia defect was seen this was dissected and reduced  If there was indirect hernia sac this was carefully mobilized off the cord structures with care to avoid injury to the gonadal vessels or spermatic cord  The remainder of the inferior flap was created  At this point Progrip mesh was selected and placed into the preperitoneal space in an appropriate fashion  The peritoneum was closed using a running V lock suture  The peritoneum was incised from the medial umbilical fold out laterally past the internal ring on the left  Using peanut the superior flap was dissected  Next the direct space was mobilized by exposing Jayro's ligament all the way along its length to the pubic tubercle  If a direct hernia defect was seen this was dissected and reduced  If there was indirect hernia sac this was carefully mobilized off the cord structures with care to avoid injury to the gonadal vessels or spermatic cord  The remainder of the inferior flap was created  At this point Progrip mesh was selected and placed into the preperitoneal space in an appropriate fashion  The peritoneum was closed using a running V lock suture         The umbilical hernia site was closed with multiple figure of eight 2-0 Prolene sutures  The wound was closed in multiple layers using 3-0 Vicryl sutures and the skin of all ports were closed using a 4-0 Monocryl subcuticular stitch  The wound was dressed with Histacryl  The patient was anatomically correct at the end of the procedure  The patient tolerated the procedure in good condition  Instrument, sponge, and needle counts were correct prior to closure and at the conclusion of the case  This text is generated with voice recognition software  There may be translation, syntax,  or grammatical errors  If you have any questions, please contact the dictating provider  Findings: bilateral reducible direct and/or indirect inguinal hernia and umbilical hernia    Estimated Blood Loss: Minimal       Specimens: All specimens were sent for pathology  * No orders in the log *         Complications: None; patient tolerated the procedure well             Disposition: PACU            Condition: stable    Signature:   Asif Galindo MD  Date: 4/24/2018 Time: 1:08 PM

## 2018-04-24 NOTE — INTERIM OP NOTE
REPAIR HERNIA INGUINAL, LAPAROSCOPIC WITH MESH; POSSIBLE LEFT INGUINAL HERNIA, REPAIR HERNIA UMBILICAL  Postoperative Note  PATIENT NAME: Ayaan Walters  : 1960  MRN: [id number]  [facility] OR ROOM 02    Surgery Date: 2018    Preop Diagnosis:  Umbilical hernia without obstruction or gangrene [K42 9]  Unilateral inguinal hernia without obstruction or gangrene, recurrence not specified [K40 90]    Post-Op Diagnosis Codes:     * Umbilical hernia without obstruction or gangrene [K42 9]     * Unilateral inguinal hernia without obstruction or gangrene, recurrence not specified [K40 90]    Procedure(s) (LRB):  REPAIR HERNIA INGUINAL, LAPAROSCOPIC WITH MESH; POSSIBLE LEFT INGUINAL HERNIA (Right)  REPAIR HERNIA UMBILICAL (Bilateral)    Surgeon(s) and Role:     * Eduard Underwood MD - Primary     * Song Alexandra PA-C - Assisting    Specimens:  * No specimens in log *    Estimated Blood Loss:   Minimal    Anesthesia Type:   General     Findings:    b/l inguinal and umbilical hernias    Complications:   None    Hernia Surgery Operative Note    Name: Ayaan Walters    Gender: male    Age: 59 y o  Race:     BMI: Body mass index is 26 62 kg/m²      DIAGNOSIS:  Bilateral inguinal and umbilical hernias    Diabetes Mellitis: No    Coronary Heart Disease: No    Cancer: No    Steroid Use: No    Tobacco use: No   Last used: never smoked       Alcohol use: No    Location of Hernia: right indirect umbilical hernia  VUGHLX:9 9 cm  Width:2 0 cm  Primary: Yes  Recurrent: No   Number of recurrences:    Access: Laparoscope    Component Separation: No    Mesh:   Yes -  Type: Synthetic   Progrip right anatomical    Location of Hernia: left indirect inguinal hernia  Length:1 0 cm  Width:1 0 cm  Primary: Yes  Recurrent: No   Number of recurrences:    Access: Laparoscope    Component Separation: No    Mesh:   Yes -  Type: Synthetic   Progrip left anatomical    Location of Hernia: umbilical   EYJWQO:2 0 cm  Width:1 0 cm  Primary: Yes  Recurrent: No   Number of recurrences:    Access: Open    Component Separation: No    Mesh:   No        Operative Time: 49 min             SIGNATURE: Azeem Alexandra PA-C   DATE: April 24, 2018   TIME: 2:30 PM

## 2018-04-24 NOTE — DISCHARGE INSTRUCTIONS
[facility] Instructions      Dr Hollie URIBE     1  General: Lorenza Jara will feel pulling sensations around the wound or funny aches and pains around the incisions  This is normal  Even minor surgery is a change in your body and this is your bodys way of reaction to it  If you have had abdominal surgery, it may help to support the incision with a small pillow or blanket for comfort when moving or coughing  2  Wound care:       Glue - Leave glue alone, it will fall off on its own, no need for an additional dressings    3  Water: You may shower over the wound, unless there are drain tubes left in place  Do not bathe or use a pool or hot tub until cleared by the physician  You may shower right over the staples, glue or Steri-Strips and rinse wound with soapy water but do not scrub incision pat dry when you are done  4  Activity: You may go up and down stairs, walk as much as you are comfortable, but walk at least 3 times each day  If you have had abdominal surgery, do not lift anything heavier than 15 pounds for at least 2 weeks, unless cleared by the doctor  5  Diet: You may resume a regular diet  If you had a same-day surgery or overnight stay surgery, you may wish to eat lightly for a few days: soups, crackers, and sandwiches  You may resume a regular diet when ready  6  Medications: Resume all of your previous medications, unless told otherwise by the doctor  Avoid aspirin or ibuprofen (Advil, Motrin, etc ) products for 2-3 days after the date of surgery  You may, at that time, began to take them again  Tylenol is always fine, unless you are taking any narcotic pain medication containing Tylenol (such as Percocet, Darvocet, Vicodin, or anything containing acetaminophen)  Do not take Tylenol if you're taking these medications  You do not need to take the narcotic pain medications unless you are having significant pain and discomfort      7  Driving: He will need someone to drive you home on the day of surgery  Do not drive or make any important decisions while on narcotic pain medication or 24 hours and after anesthesia or sedation for surgery  Generally, you may drive when your off all narcotic pain medications  8  Upset Stomach: You may take Maalox, Tums, or similar items for an upset stomach  If your narcotic pain medication causes an upset stomach, do not take it on an empty stomach  Try taking it with at least some crackers or toast      9  Constipation: Patients often experienced constipation after surgery  You may take over-the-counter medication for this, such as Metamucil, Senokot, Dulcolax, milk of magnesia, etc  You may take a suppository unless you have had anorectal surgery such as a procedure on your hemorrhoids  If you experience significant nausea or vomiting after abdominal surgery, call the office before trying any of these medications  10  Call the office: If you are experiencing any of the following, fevers above 101 5°, significant nausea or vomiting, if the wound develops drainage and/or is excessive redness around the wound, or if you have significant diarrhea or other worsening symptoms  11  Pain: You may be given a prescription for pain  This will be given to the hospital, the day of surgery  12  Sexual Activity: You may resume sexual activity when you feel ready and comfortable and your incision is sealed and healed without apparent infection risk  13   Follow-up in 2 weeks      [facility]  Phone: [phone]

## 2018-05-07 ENCOUNTER — OFFICE VISIT (OUTPATIENT)
Dept: SURGERY | Facility: HOSPITAL | Age: 58
End: 2018-05-07

## 2018-05-07 VITALS — TEMPERATURE: 98 F | WEIGHT: 220.6 LBS | HEIGHT: 75 IN | BODY MASS INDEX: 27.43 KG/M2

## 2018-05-07 DIAGNOSIS — Z09 POSTOP CHECK: Primary | ICD-10-CM

## 2018-05-07 PROCEDURE — 99024 POSTOP FOLLOW-UP VISIT: CPT | Performed by: SURGERY

## 2018-05-07 NOTE — LETTER
May 9, 2018     Lauren ManDO  [facility] [address] [address]    Patient: Gloria Ramos   YOB: 1960   Date of Visit: 5/7/2018       Dear Dr Mahogany Green: Thank you for referring Stacy Barney to me for evaluation  Below are my notes for this consultation  If you have questions, please do not hesitate to call me  I look forward to following your patient along with you  Sincerely,        Amita Bower MD        CC: No Recipients  Emmanuel Barnett  5/7/2018  3:30 PM  Sign at close encounter  Assessment/Plan: Stacy Barney is a 59year old male who presents today in post-operative state status post bilateral inguinal hernia repair with mesh and open umbilical hernia repair without mesh  Physical exam revealed his incisions are healing well  Lifting restrictions will remain in place for the next 2 weeks  He may follow up as needed  He knows to call if any questions or concerns arise  No problem-specific Assessment & Plan notes found for this encounter  There are no diagnoses linked to this encounter  Subjective:      Patient ID: Gloria Ramos is a 59 y o  male  Stacy Barney is a 59year old male who presents today in post-operative state status post umbilical and bilateral inguinal hernia repair with mesh  He reports he is doing very well  Has been following lifting restrictions  He was constipated at first but this has resolved  The following portions of the patient's history were reviewed and updated as appropriate: allergies, current medications, past family history, past medical history, past social history, past surgical history and problem list     Review of Systems   Constitutional: Negative  HENT: Negative  Eyes: Negative  Respiratory: Negative  Cardiovascular: Negative  Gastrointestinal: Negative  Endocrine: Negative  Genitourinary: Negative  Musculoskeletal: Negative  Skin: Negative  Allergic/Immunologic: Negative  Neurological: Negative  Hematological: Negative  Psychiatric/Behavioral: Negative  All other systems reviewed and are negative  Objective:      Temp 98 °F (36 7 °C) (Tympanic)   Ht 6' 3" (1 905 m)   Wt 100 kg (220 lb 9 6 oz)   BMI 27 57 kg/m²           Physical Exam   Constitutional: He is oriented to person, place, and time  He appears well-developed and well-nourished  No distress  HENT:   Head: Normocephalic and atraumatic  Right Ear: External ear normal    Left Ear: External ear normal    Nose: Nose normal    Mouth/Throat: Oropharynx is clear and moist  No oropharyngeal exudate  Eyes: Conjunctivae and EOM are normal  Right eye exhibits no discharge  Left eye exhibits no discharge  No scleral icterus  Neck: Normal range of motion  Neck supple  No JVD present  No tracheal deviation present  No thyromegaly present  Cardiovascular: Normal rate, regular rhythm, normal heart sounds and intact distal pulses  Exam reveals no gallop and no friction rub  No murmur heard  Pulmonary/Chest: Effort normal and breath sounds normal  No stridor  No respiratory distress  He has no wheezes  He has no rales  He exhibits no tenderness  Abdominal: Soft  Bowel sounds are normal  He exhibits no distension and no mass  There is no tenderness  There is no rebound and no guarding  Incisions are healing well  Musculoskeletal: Normal range of motion  He exhibits no edema, tenderness or deformity  Lymphadenopathy:     He has no cervical adenopathy  Neurological: He is alert and oriented to person, place, and time  No cranial nerve deficit  Coordination normal    Skin: Skin is dry  No rash noted  He is not diaphoretic  No erythema  No pallor  Psychiatric: He has a normal mood and affect  His behavior is normal  Thought content normal    Nursing note and vitals reviewed                  By signing my name below, Shailesh Brand, attest that this documentation has been prepared under the direction and in the presence of Emily Biswas MD  Electronically Signed: Emmanuel Preciado Mt  05/07/18  Evangelina Singh, personally performed the services described in this documentation  All medical record entries made by the scribe were at my direction and in my presence  I have reviewed the chart and discharge instructions and agree that the record reflects my personal performance and is accurate and complete  Emily Biswas MD  05/07/18

## 2018-05-07 NOTE — PROGRESS NOTES
Assessment/Plan:Heri Pike is a 59year old male who presents today in post-operative state status post umbilical and bilateral inguinal hernia repair with mesh done on 4/24/18  Physical exam revealed his incisions are healing well  Lifting restrictions will remain in place for the next 2 weeks  He may follow up as needed  He knows to call if any questions or concerns arise  No problem-specific Assessment & Plan notes found for this encounter  There are no diagnoses linked to this encounter  Subjective:      Patient ID: Ambar Pang is a 59 y o  male  Nadege Gonzales is a 59year old male who presents today in post-operative state status post umbilical and bilateral inguinal hernia repair with mesh done on 4/24/18  He reports he is doing very well  Has been following lifting restrictions  He was constipated at first but this has resolved  Appetite good  No n/v/f/c  Urinating and moving bowels normally  Never took any pain medication after surgery  The following portions of the patient's history were reviewed and updated as appropriate: allergies, current medications, past family history, past medical history, past social history, past surgical history and problem list     Review of Systems   Constitutional: Negative  HENT: Negative  Eyes: Negative  Respiratory: Negative  Cardiovascular: Negative  Gastrointestinal: Negative  Endocrine: Negative  Genitourinary: Negative  Musculoskeletal: Negative  Skin: Negative  Allergic/Immunologic: Negative  Neurological: Negative  Hematological: Negative  Psychiatric/Behavioral: Negative  All other systems reviewed and are negative  Objective:      Temp 98 °F (36 7 °C) (Tympanic)   Ht 6' 3" (1 905 m)   Wt 100 kg (220 lb 9 6 oz)   BMI 27 57 kg/m²          Physical Exam   Constitutional: He is oriented to person, place, and time  He appears well-developed and well-nourished  No distress     HENT: Head: Normocephalic and atraumatic  Right Ear: External ear normal    Left Ear: External ear normal    Nose: Nose normal    Mouth/Throat: Oropharynx is clear and moist  No oropharyngeal exudate  Eyes: Conjunctivae and EOM are normal  Right eye exhibits no discharge  Left eye exhibits no discharge  No scleral icterus  Neck: Normal range of motion  Neck supple  No JVD present  No tracheal deviation present  No thyromegaly present  Cardiovascular: Normal rate, regular rhythm, normal heart sounds and intact distal pulses  Exam reveals no gallop and no friction rub  No murmur heard  Pulmonary/Chest: Effort normal and breath sounds normal  No stridor  No respiratory distress  He has no wheezes  He has no rales  He exhibits no tenderness  Abdominal: Soft  Bowel sounds are normal  He exhibits no distension and no mass  There is no tenderness  There is no rebound and no guarding  Incisions are healing well  Musculoskeletal: Normal range of motion  He exhibits no edema, tenderness or deformity  Lymphadenopathy:     He has no cervical adenopathy  Neurological: He is alert and oriented to person, place, and time  No cranial nerve deficit  Coordination normal    Skin: Skin is dry  No rash noted  He is not diaphoretic  No erythema  No pallor  Psychiatric: He has a normal mood and affect  His behavior is normal  Thought content normal    Nursing note and vitals reviewed  By signing my name below, Spencer Dyson, attest that this documentation has been prepared under the direction and in the presence of Nav Mohan MD  Electronically Signed: Emmanuel Marie  05/07/18  Sriram Salas, personally performed the services described in this documentation  All medical record entries made by the kwabenaibmadalyn were at my direction and in my presence   I have reviewed the chart and discharge instructions and agree that the record reflects my personal performance and is accurate and complete  Morteza Carlos MD  05/07/18

## 2018-05-21 ENCOUNTER — OFFICE VISIT (OUTPATIENT)
Dept: UROLOGY | Facility: AMBULATORY SURGERY CENTER | Age: 58
End: 2018-05-21
Payer: COMMERCIAL

## 2018-05-21 VITALS
HEART RATE: 70 BPM | SYSTOLIC BLOOD PRESSURE: 130 MMHG | BODY MASS INDEX: 27.4 KG/M2 | DIASTOLIC BLOOD PRESSURE: 82 MMHG | WEIGHT: 220.4 LBS | HEIGHT: 75 IN

## 2018-05-21 DIAGNOSIS — R35.0 FREQUENCY OF URINATION: Primary | ICD-10-CM

## 2018-05-21 DIAGNOSIS — N40.1 BENIGN PROSTATIC HYPERPLASIA WITH URINARY FREQUENCY: ICD-10-CM

## 2018-05-21 DIAGNOSIS — R35.0 BENIGN PROSTATIC HYPERPLASIA WITH URINARY FREQUENCY: ICD-10-CM

## 2018-05-21 LAB
SL AMB  POCT GLUCOSE, UA: NORMAL
SL AMB LEUKOCYTE ESTERASE,UA: NORMAL
SL AMB POCT BILIRUBIN,UA: NORMAL
SL AMB POCT BLOOD,UA: NORMAL
SL AMB POCT CLARITY,UA: NORMAL
SL AMB POCT COLOR,UA: YELLOW
SL AMB POCT KETONES,UA: NORMAL
SL AMB POCT NITRITE,UA: NORMAL
SL AMB POCT PH,UA: 5
SL AMB POCT SPECIFIC GRAVITY,UA: 1.01
SL AMB POCT URINE PROTEIN: NORMAL
SL AMB POCT UROBILINOGEN: NORMAL

## 2018-05-21 PROCEDURE — 99213 OFFICE O/P EST LOW 20 MIN: CPT | Performed by: UROLOGY

## 2018-05-21 PROCEDURE — 81002 URINALYSIS NONAUTO W/O SCOPE: CPT | Performed by: UROLOGY

## 2018-05-21 NOTE — PATIENT INSTRUCTIONS
Cystoscopy   AMBULATORY CARE:   A cystoscopy  is a procedure to look inside of your urethra and bladder using a cystoscope  A cystoscope is a small tube with a light and magnifying camera on the end  The procedure is used to diagnose and treat conditions of the bladder, urethra, and prostate  The procedure is also done to remove stones or blood clots from the urethra or bladder  Your healthcare provider may do other tests, such as ureteroscopy, during a cystoscopy  Prepare for your cystoscopy: You may need to stop smoking several days before your procedure, if you are having general anesthesia  Tell your healthcare provider what medicines you take  Your healthcare provider will tell you what medicines to take and not to take on the day of your procedure  You may need to stop taking medicines such as anticoagulants, aspirin, and ibuprofen several days before your procedure  He may tell you stop eating after midnight the night before your procedure  You may be asked to drink a large amount of liquids before your procedure  Make plans for someone to drive you home after your procedure  During your cystoscopy:   · You may be given general anesthesia to keep you asleep and pain free during your procedure  Your healthcare provider may give you anesthesia in your spine  With spinal anesthesia the lower part of your body will be numb  You will not feel pain during your procedure  Your healthcare provider may instead use local anesthesia that is put into your urethra and bladder  You will not feel pain, but you may be able to feel some pressure during your procedure  With local anesthesia, you may feel burning or need to urinate when the cystoscope is put in and removed  · You will be placed on your back and your feet may be placed in stirrups  The cystoscope will be will be placed through your urethra and into your bladder   The urologist will look at the walls of your urethra as the scope goes through to your bladder  Your bladder may be filled with an irrigation liquid to help your urologist see inside of your bladder more clearly   Special tools may used to remove tissue or stones  Your urologist may use a special tool to stop bleeding in your bladder  If there are blood clots in your bladder, your healthcare provider will inject an irrigation fluid into your bladder  Then he will use suction to remove the fluid and blood clots  After a cystoscopy:  After you are fully awake, you will go home  After your cystoscopy, it is normal to have pink-colored urine  It is also normal to have an increased need to urinate  You may have burning when you urinate  If you had general anesthesia, it may take at least 24 hours before you feel like your usual self  Risks of a cystoscopy: You may bleed more than expected or develop an infection  Swelling caused by the cystoscopy may cause a blockage or slow urine flow  Seek care immediately if:   · Your urine turns from pink to red, or you have clots in your urine  · You cannot urinate and your bladder feels full  · Your pain or burning becomes worse or lasts longer than 2 days  Contact your healthcare provider or urologist if:   · Your urine stays pink for longer than 3 days  · Your pain or burning becomes worse  · Your skin is itchy, swollen, or has a new rash  · You have a fever and chills  · You have questions or concerns about your condition or care  After your cystoscopy: It is normal to have pink-colored urine  It is also normal to have an increased need to urinate and burning when you urinate  If you had general anesthesia, it may take at least 24 hours before you feel like your usual self  · Drink at least 3 to 4 glasses of water daily for 2 days after your procedure  Avoid acidic juices such as orange juice and lemonade  Water can help prevent blood clots from forming   It can also help decrease the amount of acid in your urine that can cause burning  · Sit in a warm tub of water  Warm baths relieve pain and bladder spasms  · Do not have sex  until your healthcare provider tells you it is okay  Sex may increase your risk for a urinary tract infection  Medicines: You may  be given any of the following:  · Antibiotics  help treat or prevent a bacterial infection  · Acetaminophen  decreases pain and fever  It is available without a doctor's order  Ask how much to take and how often to take it  Follow directions  Read the labels of all other medicines you are using to see if they also contain acetaminophen, or ask your doctor or pharmacist  Acetaminophen can cause liver damage if not taken correctly  Do not use more than 4 grams (4,000 milligrams) total of acetaminophen in one day  · Take your medicine as directed  Contact your healthcare provider if you think your medicine is not helping or if you have side effects  Tell him or her if you are allergic to any medicine  Keep a list of the medicines, vitamins, and herbs you take  Include the amounts, and when and why you take them  Bring the list or the pill bottles to follow-up visits  Carry your medicine list with you in case of an emergency  Follow up with your healthcare provider as directed: You may need to have another cystoscopy  Write down your questions so you remember to ask them during your visits  © 2017 [employer]  Information is for End User's use only and may not be sold, redistributed or otherwise used for commercial purposes  All illustrations and images included in CareNotes® are the copyrighted property of [employer]  or [employer]  The above information is an  only  It is not intended as medical advice for individual conditions or treatments  Talk to your doctor, nurse or pharmacist before following any medical regimen to see if it is safe and effective for you

## 2018-05-21 NOTE — PROGRESS NOTES
5/21/2018    [facility] ([place], [place])  1960  [id number]      Assessment  -BPH with lower urinary tract symptoms  -Urinary frequency    Discussion/Plan  Bereket Cuello is a 59 y o  male being managed by Dr Kenny Flores  -AUA 12, QOL 4  -Will schedule patient for in office flexible cystoscopy to further evaluate BPH  Reviewed procedure  Discussed in detail potential surgical options including TURP and UroLift     -Follow up for cystoscopy with Dr Kenny Flores  Patient will be due for repeat PSA for routine prostate cancer screening    -All questions answered, patient agrees with plan      History of Present Illness  59 y o  male with a history of BPH with UTIs and urinary frequency presents today for follow up  Patient continues to take tamsulosin, finasteride, anoxic for urinary urgency, frequency, and nocturia up to 5 times per night  States mild improvement with oxybutynin, but continues to have persistent and bothersome symptoms  Denies any gross hematuria or dysuria, feels he empties bladder to completion  Last PSA from 1/3/17 was 1 2  Review of Systems  Review of Systems   Constitutional: Negative  HENT: Negative  Respiratory: Negative  Cardiovascular: Negative  Gastrointestinal: Negative  Genitourinary: Positive for frequency and urgency  Negative for decreased urine volume, difficulty urinating, dysuria, flank pain and hematuria  Musculoskeletal: Negative  Skin: Negative  Neurological: Negative  Psychiatric/Behavioral: Negative  AUA SYMPTOM SCORE      Most Recent Value   AUA SYMPTOM SCORE   How often have you had a sensation of not emptying your bladder completely after you finished urinating? 1   How often have you had to urinate again less than two hours after you finished urinating? 5   How often have you found you stopped and started again several times when you urinate?  0   How often have you found it difficult to postpone urination?   2   How often have you had a weak urinary stream?  1   How often have you had to push or strain to begin urination? 0   How many times did you most typically get up to urinate from the time you went to bed at night until the time you got up in the morning? 3   Quality of Life: If you were to spend the rest of your life with your urinary condition just the way it is now, how would you feel about that?  4   AUA SYMPTOM SCORE  12            Past Medical History  Past Medical History:   Diagnosis Date    BPH (benign prostatic hyperplasia)     Carotid bruit     Hypertension        Past Social History  Past Surgical History:   Procedure Laterality Date    COLONOSCOPY      TN LAP,INGUINAL HERNIA REPR,INITIAL Right 4/24/2018    Procedure: REPAIR HERNIA INGUINAL, LAPAROSCOPIC WITH MESH; POSSIBLE LEFT INGUINAL HERNIA;  Surgeon: Baird Councilman, MD;  Location: [facility];  Service: General    TN REPAIR UMBILICAL UJOK,2+X/A,TWIYY Bilateral 4/24/2018    Procedure: REPAIR HERNIA UMBILICAL;  Surgeon: Baird Councilman, MD;  Location: [facility];  Service: General       Past Family History  Family History   Problem Relation Age of Onset    Hypertension Mother        Past Social history  Social History     Social History    Marital status: /Civil Union     Spouse name: N/A    Number of children: N/A    Years of education: N/A     Occupational History    Not on file       Social History Main Topics    Smoking status: Never Smoker    Smokeless tobacco: Never Used    Alcohol use No    Drug use: No    Sexual activity: Not on file     Other Topics Concern    Not on file     Social History Narrative    No narrative on file       Current Medications  Current Outpatient Prescriptions   Medication Sig Dispense Refill    finasteride (PROSCAR) 5 mg tablet TAKE 1 TABLET EVERY DAY 90 tablet 2    Multiple Vitamin (MULTIVITAMIN) capsule Take 1 capsule by mouth daily      oxybutynin (DITROPAN-XL) 10 MG 24 hr tablet TAKE 1 TABLET EVERY DAY 90 tablet 2  tamsulosin (FLOMAX) 0 4 mg TAKE 1 CAPSULE EVERY DAY AT BEDTIME 90 capsule 3     No current facility-administered medications for this visit  Allergies  No Known Allergies    Past Medical History, Social History, Family History, medications and allergies were reviewed  Vitals  Vitals:    05/21/18 0932   BP: 130/82   BP Location: Left arm   Patient Position: Sitting   Cuff Size: Adult   Pulse: 70   Weight: 100 kg (220 lb 6 4 oz)   Height: 6' 3" (1 905 m)       Physical Exam  Physical Exam   Constitutional: He is oriented to person, place, and time  He appears well-developed and well-nourished  HENT:   Head: Normocephalic  Eyes: Pupils are equal, round, and reactive to light  Neck: Normal range of motion  Cardiovascular: Normal rate and regular rhythm  Pulmonary/Chest: Effort normal    Abdominal: Soft  Normal appearance  There is no CVA tenderness  Musculoskeletal: Normal range of motion  Neurological: He is alert and oriented to person, place, and time  He has normal reflexes  Skin: Skin is warm and dry  Psychiatric: He has a normal mood and affect   His behavior is normal  Judgment and thought content normal        Results    I have personally reviewed all pertinent lab results and reviewed with patient  Lab Results   Component Value Date    PSA 1 2 01/03/2017     Lab Results   Component Value Date    GLUCOSE 96 04/16/2018    CALCIUM 8 5 04/16/2018     04/16/2018    K 3 8 04/16/2018    CO2 27 04/16/2018     04/16/2018    BUN 26 (H) 04/16/2018    CREATININE 0 83 04/16/2018     Lab Results   Component Value Date    WBC 5 74 04/16/2018    HGB 16 0 04/16/2018    HCT 46 1 04/16/2018    MCV 91 04/16/2018     04/16/2018     Recent Results (from the past 1 hour(s))   POCT urine dip    Collection Time: 05/21/18  9:36 AM   Result Value Ref Range    LEUKOCYTE ESTERASE,UA -      NITRITE,UA -     SL AMB POCT UROBILINOGEN -     SL AMB POCT URINE PROTEIN -      PH,UA 5 0 [place] 33 1 010      [address],  -      COLOR,UA yellow      CLARITY,UA transparent

## 2018-06-22 ENCOUNTER — PROCEDURE VISIT (OUTPATIENT)
Dept: UROLOGY | Facility: HOSPITAL | Age: 58
End: 2018-06-22
Payer: COMMERCIAL

## 2018-06-22 VITALS
WEIGHT: 218.6 LBS | BODY MASS INDEX: 26.62 KG/M2 | DIASTOLIC BLOOD PRESSURE: 84 MMHG | HEIGHT: 76 IN | SYSTOLIC BLOOD PRESSURE: 120 MMHG | HEART RATE: 80 BPM

## 2018-06-22 DIAGNOSIS — N40.0 BENIGN PROSTATIC HYPERPLASIA WITHOUT LOWER URINARY TRACT SYMPTOMS: Primary | ICD-10-CM

## 2018-06-22 LAB
SL AMB  POCT GLUCOSE, UA: 0
SL AMB LEUKOCYTE ESTERASE,UA: NORMAL
SL AMB POCT BILIRUBIN,UA: 0
SL AMB POCT BLOOD,UA: NORMAL
SL AMB POCT CLARITY,UA: CLEAR
SL AMB POCT COLOR,UA: YELLOW
SL AMB POCT KETONES,UA: 0
SL AMB POCT NITRITE,UA: 0
SL AMB POCT PH,UA: 7
SL AMB POCT SPECIFIC GRAVITY,UA: 1.01
SL AMB POCT URINE PROTEIN: 0
SL AMB POCT UROBILINOGEN: 0

## 2018-06-22 PROCEDURE — 81002 URINALYSIS NONAUTO W/O SCOPE: CPT | Performed by: UROLOGY

## 2018-06-22 PROCEDURE — 52000 CYSTOURETHROSCOPY: CPT | Performed by: UROLOGY

## 2018-06-22 NOTE — PROGRESS NOTES
Written Consent Obtained      Indications for Procedure:   severe lower urinary tract symptoms  His symptoms have improved on a combination of finasteride, tamsulosin, and oxybutynin  He continues to have some nocturia  He would like to discuss other surgical options however so that he can get off of these medications      Physical Exam     Constitutional   General appearance: No acute distress, well appearing and well nourished  Pulmonary   Respiratory effort: No increased work of breathing or signs of respiratory distress  Cardiovascular   Examination of extremities for edema and/or varicosities: Normal     Abdomen   Abdomen: Non-tender, no masses  Liver and spleen: No hepatomegaly or splenomegaly  Genitourinary   Normal phallus and meatus   Musculoskeletal   Gait and station: Normal     Skin   Skin and subcutaneous tissue: Normal without rashes or lesions  Lymphatic   Palpation of lymph nodes in groin: No lymphadenopathy  Additional Exam: Neuro exam nonfocal   The flexible cystoscope was introduced into the urethra and advanced into the bladder  URETHRA:  Normal,  without strictures or lesions  PROSTATE:  Long prostate with severe bilobar obstruction and high bladder neck  TRIGONE & UOs:   Normal anatomy with efflux of clear urine  No mucosal lesions or subtrigonal masses  BLADDER MUCOSA:  Normal, without neoplasms or other lesions  DETRUSOR: Normal capacity, without flaccidity, without excessive compliance, without trabeculation or diverticula, without uninhibited bladder contractions on fillings  RETROFLEXED SCOPE VIEW:  There is a papillary lesion approximately 3 mm in size at the 6 o'clock position of the bladder neck  Plan:  I had a lengthy discussion with the patient and his wife regarding treatment options  We discussed there is a papillary lesion protruding in from the prostate at the 6 o'clock position that needs to be removed and biopsied    We discussed that he is a candidate for either Uro lift or transurethral resection of the prostate  We discussed that he has symptoms with likely improve the most with transurethral resection of the prostate but this is a more invasive procedure  Risks and benefits of both procedures were discussed in detail as well as there perioperative courses  The patient would like to think about his options and contact us back to schedule surgery  In addition to the cystoscopy I spent over 15 minutes discussing treatment options with the patient and his wife

## 2018-07-25 ENCOUNTER — TELEPHONE (OUTPATIENT)
Dept: UROLOGY | Facility: AMBULATORY SURGERY CENTER | Age: 58
End: 2018-07-25

## 2018-07-26 ENCOUNTER — TELEPHONE (OUTPATIENT)
Dept: UROLOGY | Facility: HOSPITAL | Age: 58
End: 2018-07-26

## 2018-10-08 ENCOUNTER — TELEPHONE (OUTPATIENT)
Dept: FAMILY MEDICINE CLINIC | Facility: CLINIC | Age: 58
End: 2018-10-08

## 2018-10-16 ENCOUNTER — TELEPHONE (OUTPATIENT)
Dept: UROLOGY | Facility: MEDICAL CENTER | Age: 58
End: 2018-10-16

## 2018-10-16 NOTE — TELEPHONE ENCOUNTER
Wife called to advise Dr Roberts Sayres  that patient has an aggressive form of non-invasive bladder cancer

## 2019-02-08 DIAGNOSIS — N40.0 BENIGN PROSTATIC HYPERPLASIA, UNSPECIFIED WHETHER LOWER URINARY TRACT SYMPTOMS PRESENT: ICD-10-CM

## 2019-02-13 RX ORDER — OXYBUTYNIN CHLORIDE 10 MG/1
TABLET, EXTENDED RELEASE ORAL
Qty: 90 TABLET | Refills: 2 | OUTPATIENT
Start: 2019-02-13

## 2019-04-18 DIAGNOSIS — Z12.5 SCREENING PSA (PROSTATE SPECIFIC ANTIGEN): ICD-10-CM

## 2019-04-18 DIAGNOSIS — Z13.0 SCREENING, ANEMIA, DEFICIENCY, IRON: ICD-10-CM

## 2019-04-18 DIAGNOSIS — Z13.220 SCREENING, LIPID: Primary | ICD-10-CM

## 2019-04-18 DIAGNOSIS — Z13.29 SCREENING FOR THYROID DISORDER: ICD-10-CM

## 2019-04-18 DIAGNOSIS — Z11.59 ENCOUNTER FOR HEPATITIS C SCREENING TEST FOR LOW RISK PATIENT: ICD-10-CM

## 2019-04-18 DIAGNOSIS — Z13.1 SCREENING FOR DIABETES MELLITUS: ICD-10-CM

## 2019-08-27 ENCOUNTER — TELEPHONE (OUTPATIENT)
Dept: FAMILY MEDICINE CLINIC | Facility: CLINIC | Age: 59
End: 2019-08-27

## 2020-01-17 ENCOUNTER — HOSPITAL ENCOUNTER (OUTPATIENT)
Dept: RADIOLOGY | Facility: HOSPITAL | Age: 60
Discharge: HOME | End: 2020-01-17
Payer: COMMERCIAL

## 2020-01-17 ENCOUNTER — OFFICE VISIT (OUTPATIENT)
Dept: FAMILY MEDICINE CLINIC | Facility: CLINIC | Age: 60
End: 2020-01-17
Payer: COMMERCIAL

## 2020-01-17 VITALS
HEIGHT: 76 IN | DIASTOLIC BLOOD PRESSURE: 80 MMHG | HEART RATE: 89 BPM | OXYGEN SATURATION: 98 % | SYSTOLIC BLOOD PRESSURE: 152 MMHG | WEIGHT: 220.5 LBS | BODY MASS INDEX: 26.85 KG/M2 | TEMPERATURE: 98.8 F

## 2020-01-17 DIAGNOSIS — Z11.59 ENCOUNTER FOR HEPATITIS C SCREENING TEST FOR LOW RISK PATIENT: ICD-10-CM

## 2020-01-17 DIAGNOSIS — M25.511 CHRONIC RIGHT SHOULDER PAIN: ICD-10-CM

## 2020-01-17 DIAGNOSIS — Z13.220 SCREENING, LIPID: ICD-10-CM

## 2020-01-17 DIAGNOSIS — G89.29 CHRONIC RIGHT SHOULDER PAIN: ICD-10-CM

## 2020-01-17 DIAGNOSIS — Z13.29 SCREENING FOR THYROID DISORDER: ICD-10-CM

## 2020-01-17 DIAGNOSIS — I10 ESSENTIAL HYPERTENSION: ICD-10-CM

## 2020-01-17 DIAGNOSIS — Z00.00 WELL ADULT EXAM: Primary | ICD-10-CM

## 2020-01-17 DIAGNOSIS — Z23 NEED FOR VACCINATION: ICD-10-CM

## 2020-01-17 PROBLEM — K40.90 RIGHT INGUINAL HERNIA: Status: RESOLVED | Noted: 2018-01-19 | Resolved: 2020-01-17

## 2020-01-17 PROBLEM — C67.9 MALIGNANT NEOPLASM OF URINARY BLADDER (HCC): Status: ACTIVE | Noted: 2020-01-17

## 2020-01-17 PROBLEM — K40.90 RIGHT INGUINAL HERNIA: Status: ACTIVE | Noted: 2018-01-19

## 2020-01-17 PROCEDURE — 99396 PREV VISIT EST AGE 40-64: CPT | Performed by: FAMILY MEDICINE

## 2020-01-17 PROCEDURE — 3079F DIAST BP 80-89 MM HG: CPT | Performed by: FAMILY MEDICINE

## 2020-01-17 PROCEDURE — 73030 X-RAY EXAM OF SHOULDER: CPT

## 2020-01-17 PROCEDURE — 90715 TDAP VACCINE 7 YRS/> IM: CPT

## 2020-01-17 PROCEDURE — 90471 IMMUNIZATION ADMIN: CPT

## 2020-01-17 PROCEDURE — 3077F SYST BP >= 140 MM HG: CPT | Performed by: FAMILY MEDICINE

## 2020-01-17 NOTE — PATIENT INSTRUCTIONS
Xray right shoulder- if xray ok except for arthritis, physical therapy  If more significant then ortho eval  We will call [facility] gi for follow up on colonoscopy   Tdap given   Deferring influenza today   Consider shingrix   Blood work - lipid panel, tsh      Wellness Visit for Adults   AMBULATORY CARE:   A wellness visit  is when you see your healthcare provider to get screened for health problems  You can also get advice on how to stay healthy  Write down your questions so you remember to ask them  Ask your healthcare provider how often you should have a wellness visit  What happens at a wellness visit:  Your healthcare provider will ask about your health, and your family history of health problems  This includes high blood pressure, heart disease, and cancer  He or she will ask if you have symptoms that concern you, if you smoke, and about your mood  You may also be asked about your intake of medicines, supplements, food, and alcohol  Any of the following may be done:  · Your weight  will be checked  Your height may also be checked so your body mass index (BMI) can be calculated  Your BMI shows if you are at a healthy weight  · Your blood pressure  and heart rate will be checked  Your temperature may also be checked  · Blood and urine tests  may be done  Blood tests may be done to check your cholesterol levels  Abnormal cholesterol levels increase your risk for heart disease and stroke  You may also need a blood or urine test to check for diabetes if you are at increased risk  Urine tests may be done to look for signs of an infection or kidney disease  · A physical exam  includes checking your heartbeat and lungs with a stethoscope  Your healthcare provider may also check your skin to look for sun damage  · Screening tests  may be recommended  A screening test is done to check for diseases that may not cause symptoms   The screening tests you may need depend on your age, gender, family history, and lifestyle habits  For example, colorectal screening may be recommended if you are 48years old or older  Screening tests you need if you are a woman:   · A Pap smear  is used to screen for cervical cancer  Pap smears are usually done every 3 to 5 years depending on your age  You may need them more often if you have had abnormal Pap smear test results in the past  Ask your healthcare provider how often you should have a Pap smear  · A mammogram  is an x-ray of your breasts to screen for breast cancer  Experts recommend mammograms every 2 years starting at age 48 years  You may need a mammogram at age 52 years or younger if you have an increased risk for breast cancer  Talk to your healthcare provider about when you should start having mammograms and how often you need them  Vaccines you may need:   · Get an influenza vaccine  every year  The influenza vaccine protects you from the flu  Several types of viruses cause the flu  The viruses change over time, so new vaccines are made each year  · Get a tetanus-diphtheria (Td) booster vaccine  every 10 years  This vaccine protects you against tetanus and diphtheria  Tetanus is a severe infection that may cause painful muscle spasms and lockjaw  Diphtheria is a severe bacterial infection that causes a thick covering in the back of your mouth and throat  · Get a human papillomavirus (HPV) vaccine  if you are female and aged 23 to 32 or male 23 to 24 and never received it  This vaccine protects you from HPV infection  HPV is the most common infection spread by sexual contact  HPV may also cause vaginal, penile, and anal cancers  · Get a pneumococcal vaccine  if you are aged 72 years or older  The pneumococcal vaccine is an injection given to protect you from pneumococcal disease  Pneumococcal disease is an infection caused by pneumococcal bacteria  The infection may cause pneumonia, meningitis, or an ear infection      · Get a shingles vaccine  if you are aged 61 or older, even if you have had shingles before  The shingles vaccine is an injection to protect you from the varicella-zoster virus  This is the same virus that causes chickenpox  Shingles is a painful rash that develops in people who had chickenpox or have been exposed to the virus  How to eat healthy:  My Plate is a model for planning healthy meals  It shows the types and amounts of foods that should go on your plate  Fruits and vegetables make up about half of your plate, and grains and protein make up the other half  A serving of dairy is included on the side of your plate  The amount of calories and serving sizes you need depends on your age, gender, weight, and height  Examples of healthy foods are listed below:  · Eat a variety of vegetables  such as dark green, red, and orange vegetables  You can also include canned vegetables low in sodium (salt) and frozen vegetables without added butter or sauces  · Eat a variety of fresh fruits , canned fruit in 100% juice, frozen fruit, and dried fruit  · Include whole grains  At least half of the grains you eat should be whole grains  Examples include whole-wheat bread, wheat pasta, brown rice, and whole-grain cereals such as oatmeal     · Eat a variety of protein foods such as seafood (fish and shellfish), lean meat, and poultry without skin (turkey and chicken)  Examples of lean meats include pork leg, shoulder, or tenderloin, and beef round, sirloin, tenderloin, and extra lean ground beef  Other protein foods include eggs and egg substitutes, beans, peas, soy products, nuts, and seeds  · Choose low-fat dairy products such as skim or 1% milk or low-fat yogurt, cheese, and cottage cheese  · Limit unhealthy fats  such as butter, hard margarine, and shortening  Exercise:  Exercise at least 30 minutes per day on most days of the week  Some examples of exercise include walking, biking, dancing, and swimming   You can also fit in more physical activity by taking the stairs instead of the elevator or parking farther away from stores  Include muscle strengthening activities 2 days each week  Regular exercise provides many health benefits  It helps you manage your weight, and decreases your risk for type 2 diabetes, heart disease, stroke, and high blood pressure  Exercise can also help improve your mood  Ask your healthcare provider about the best exercise plan for you  General health and safety guidelines:   · Do not smoke  Nicotine and other chemicals in cigarettes and cigars can cause lung damage  Ask your healthcare provider for information if you currently smoke and need help to quit  E-cigarettes or smokeless tobacco still contain nicotine  Talk to your healthcare provider before you use these products  · Limit alcohol  A drink of alcohol is 12 ounces of beer, 5 ounces of wine, or 1½ ounces of liquor  · Lose weight, if needed  Being overweight increases your risk of certain health conditions  These include heart disease, high blood pressure, type 2 diabetes, and certain types of cancer  · Protect your skin  Do not sunbathe or use tanning beds  Use sunscreen with a SPF 15 or higher  Apply sunscreen at least 15 minutes before you go outside  Reapply sunscreen every 2 hours  Wear protective clothing, hats, and sunglasses when you are outside  · Drive safely  Always wear your seatbelt  Make sure everyone in your car wears a seatbelt  A seatbelt can save your life if you are in an accident  Do not use your cell phone when you are driving  This could distract you and cause an accident  Pull over if you need to make a call or send a text message  · Practice safe sex  Use latex condoms if are sexually active and have more than one partner  Your healthcare provider may recommend screening tests for sexually transmitted infections (STIs)  · Wear helmets, lifejackets, and protective gear    Always wear a helmet when you ride a bike or motorcycle, go skiing, or play sports that could cause a head injury  Wear protective equipment when you play sports  Wear a lifejacket when you are on a boat or doing water sports  © 2017 [employer] Information is for End User's use only and may not be sold, redistributed or otherwise used for commercial purposes  All illustrations and images included in CareNotes® are the copyrighted property of [employer]  or [employer]  The above information is an  only  It is not intended as medical advice for individual conditions or treatments  Talk to your doctor, nurse or pharmacist before following any medical regimen to see if it is safe and effective for you

## 2020-01-17 NOTE — PROGRESS NOTES
Carie Herman is a 62 y o   male and is here for routine health maintenance  The patient reports no problems  History of Present Illness     Patient is here for physical today  Not seen in the office about 1 year  Complains of  Right shoulder pain, throwing a snowball in college and has had pain since that time     Complains of Clicking and certain motion cause pain  Well Adult Physical   Patient here for a comprehensive physical exam       Diet and Physical Activity  Diet: well balanced diet  Weight concerns: Patient is overweight (BMI 25 0-29  9)  Exercise: daily      Depression Screen  PHQ-9 Depression Screening    PHQ-9:    Frequency of the following problems over the past two weeks:       Little interest or pleasure in doing things:  0 - not at all  Feeling down, depressed, or hopeless:  0 - not at all  PHQ-2 Score:  0          General Health  Hearing: Normal:  bilateral  Vision: no vision problems  Dental: regular dental visits      Cancer Screening  Colononoscopy 2013, 10 year follow up  PSA     Smoker NO   Annual screening with low-dose helical computed tomography (CT) for patients age 54 to 76 years with history of smoking at least 30 pack-years and, if a former smoker, had quit within the previous 15 years      The following portions of the patient's history were reviewed and updated as appropriate: allergies, current medications, past family history, past medical history, past social history, past surgical history and problem list     Review of Systems     Review of Systems   Constitutional: Negative  Negative for fatigue and fever  HENT: Negative  Eyes: Negative  Respiratory: Negative  Negative for cough  Cardiovascular: Negative  Gastrointestinal: Negative  Endocrine: Negative  Genitourinary: Negative  Musculoskeletal: Negative  Skin: Negative  Allergic/Immunologic: Negative  Neurological: Negative  Psychiatric/Behavioral: Negative  Past Medical History     Past Medical History:   Diagnosis Date    Bladder cancer ([address] )     BPH (benign prostatic hyperplasia)     Carotid bruit     History of hematuria     Hypertension        Past Surgical History     Past Surgical History:   Procedure Laterality Date    COLONOSCOPY      FL LAP,INGUINAL HERNIA REPR,INITIAL Right 4/24/2018    Procedure: REPAIR HERNIA INGUINAL, LAPAROSCOPIC WITH MESH; POSSIBLE LEFT INGUINAL HERNIA;  Surgeon: Ansley Khalil MD;  Location:  MAIN OR;  Service: General    FL REPAIR UMBILICAL FCUF,8+U/P,VOOME Bilateral 4/24/2018    Procedure: REPAIR HERNIA UMBILICAL;  Surgeon: Ansley Khalil MD;  Location:  MAIN OR;  Service: General    TURP VAPORIZATION         Social History     Social History     Socioeconomic History    Marital status: /Civil Union     Spouse name: None    Number of children: None    Years of education: None    Highest education level: None   Occupational History    None   Social Needs    Financial resource strain: None    Food insecurity:     Worry: None     Inability: None    Transportation needs:     Medical: None     Non-medical: None   Tobacco Use    Smoking status: Never Smoker    Smokeless tobacco: Never Used   Substance and Sexual Activity    Alcohol use: No    Drug use: No    Sexual activity: None   Lifestyle    Physical activity:     Days per week: None     Minutes per session: None    Stress: None   Relationships    Social connections:     Talks on phone: None     Gets together: None     Attends Holiness service: None     Active member of club or organization: None     Attends meetings of clubs or organizations: None     Relationship status: None    Intimate partner violence:     Fear of current or ex partner: None     Emotionally abused: None     Physically abused: None     Forced sexual activity: None   Other Topics Concern    None   Social History Narrative    None       Family History     Family History   Problem Relation Age of Onset    Hypertension Mother     Heart disease Father     Hyperlipidemia Father     Other Father         CABG    Breast cancer Sister     Diabetes Daughter        Current Medications       Current Outpatient Medications:     Multiple Vitamin (MULTIVITAMIN) capsule, Take 1 capsule by mouth daily, Disp: , Rfl:      Allergies     No Known Allergies    Objective     /80   Pulse 89   Temp 98 8 °F (37 1 °C)   Ht 6' 4" (1 93 m)   Wt 100 kg (220 lb 8 oz)   SpO2 98%   BMI 26 84 kg/m²      Physical Exam   Constitutional: He is oriented to person, place, and time  He appears well-developed and well-nourished  HENT:   Head: Normocephalic  Right Ear: External ear normal    Left Ear: External ear normal    Nose: Nose normal    Mouth/Throat: Oropharynx is clear and moist    Eyes: Pupils are equal, round, and reactive to light  Conjunctivae are normal    Neck: Normal range of motion  Neck supple  Cardiovascular: Normal rate, regular rhythm, normal heart sounds and intact distal pulses  Pulmonary/Chest: Effort normal and breath sounds normal    Abdominal: Soft  Bowel sounds are normal    Neurological: He is alert and oriented to person, place, and time  Skin: Skin is warm and dry  Psychiatric: He has a normal mood and affect  His behavior is normal  Judgment and thought content normal    Nursing note and vitals reviewed  BMI Counseling: Body mass index is 26 84 kg/m²  The BMI is above normal  Nutrition recommendations include reducing portion sizes  Exercise recommendations include exercising 3-5 times per week    No exam data present    Health Maintenance     Health Maintenance   Topic Date Due    Hepatitis C Screening  1960    HIV Screening  09/24/1973    BMI: Followup Plan  09/24/1976    Annual Physical  09/24/1976    Influenza Vaccine  02/13/2020 (Originally 7/1/2019)    Depression Screening PHQ  01/17/2021    BMI: Adult  01/17/2021    CRC Screening: Colonoscopy  02/11/2023    Pneumococcal Vaccine: 65+ Years (1 of 2 - PCV13) 09/24/2023    DTaP,Tdap,and Td Vaccines (3 - Td) 01/17/2030    Pneumococcal Vaccine: Pediatrics (0 to 5 Years) and At-Risk Patients (6 to 59 Years)  Aged Out    HIB Vaccine  Aged Out    Hepatitis B Vaccine  Aged Out    IPV Vaccine  Aged Out    Hepatitis A Vaccine  Aged Out    Meningococcal ACWY Vaccine  Aged Out    HPV Vaccine  Aged [employer] History   Administered Date(s) Administered    Tdap 05/23/2008, 01/17/2020       Assessment/Plan       1  Healthy male exam   2  Patient Counseling:   · Nutrition: Stressed importance of a well balanced diet, moderation of sodium/saturated fat, caloric balance and sufficient intake of fiber  · Exercise: Stressed the importance of regular exercise with a goal of 150 minutes per week  · Dental Health: Discussed daily flossing and brushing and regular dental visits     · Immunizations reviewed  · Discussed benefits of screening   · Discussed the patient's BMI with him  The BMI is above average; BMI management plan is completed  3  Cancer Screening   4  Labs   5  Follow up in one year      Pancho Richard DO

## 2020-01-21 DIAGNOSIS — M19.011 PRIMARY OSTEOARTHRITIS OF RIGHT SHOULDER: Primary | ICD-10-CM

## 2020-05-05 ENCOUNTER — TELEMEDICINE (OUTPATIENT)
Dept: FAMILY MEDICINE CLINIC | Facility: CLINIC | Age: 60
End: 2020-05-05
Payer: COMMERCIAL

## 2020-05-05 VITALS — HEIGHT: 75 IN | BODY MASS INDEX: 25.99 KG/M2 | WEIGHT: 209 LBS

## 2020-05-05 DIAGNOSIS — R05.9 COUGH: Primary | ICD-10-CM

## 2020-05-05 PROCEDURE — 3008F BODY MASS INDEX DOCD: CPT | Performed by: FAMILY MEDICINE

## 2020-05-05 PROCEDURE — 99213 OFFICE O/P EST LOW 20 MIN: CPT | Performed by: FAMILY MEDICINE

## 2020-05-06 PROBLEM — Z00.00 WELL ADULT EXAM: Status: RESOLVED | Noted: 2020-01-17 | Resolved: 2020-05-06

## 2020-05-06 PROBLEM — R05.9 COUGH: Status: ACTIVE | Noted: 2020-05-06

## 2020-05-06 PROBLEM — Z23 NEED FOR VACCINATION: Status: RESOLVED | Noted: 2020-01-17 | Resolved: 2020-05-06

## 2020-05-29 ENCOUNTER — TELEPHONE (OUTPATIENT)
Dept: OTHER | Facility: OTHER | Age: 60
End: 2020-05-29

## 2020-06-02 PROBLEM — M53.3 PAIN OF BOTH SACROILIAC JOINTS: Status: ACTIVE | Noted: 2020-06-02

## 2020-06-02 PROBLEM — M54.2 CHRONIC NECK PAIN: Status: ACTIVE | Noted: 2020-06-02

## 2020-06-02 PROBLEM — G89.29 CHRONIC NECK PAIN: Status: ACTIVE | Noted: 2020-06-02

## 2020-06-09 ENCOUNTER — EVALUATION (OUTPATIENT)
Dept: PHYSICAL THERAPY | Facility: CLINIC | Age: 60
End: 2020-06-09
Payer: COMMERCIAL

## 2020-06-09 DIAGNOSIS — M25.611 STIFFNESS OF RIGHT SHOULDER JOINT: ICD-10-CM

## 2020-06-09 DIAGNOSIS — M25.511 CHRONIC RIGHT SHOULDER PAIN: Primary | ICD-10-CM

## 2020-06-09 DIAGNOSIS — G89.29 CHRONIC RIGHT SHOULDER PAIN: Primary | ICD-10-CM

## 2020-06-09 DIAGNOSIS — M54.2 NECK PAIN: ICD-10-CM

## 2020-06-09 PROCEDURE — 97110 THERAPEUTIC EXERCISES: CPT | Performed by: PHYSICAL THERAPIST

## 2020-06-09 PROCEDURE — 97140 MANUAL THERAPY 1/> REGIONS: CPT | Performed by: PHYSICAL THERAPIST

## 2020-06-09 PROCEDURE — 97162 PT EVAL MOD COMPLEX 30 MIN: CPT | Performed by: PHYSICAL THERAPIST

## 2020-06-11 ENCOUNTER — OFFICE VISIT (OUTPATIENT)
Dept: PHYSICAL THERAPY | Facility: CLINIC | Age: 60
End: 2020-06-11
Payer: COMMERCIAL

## 2020-06-11 DIAGNOSIS — M54.2 NECK PAIN: ICD-10-CM

## 2020-06-11 DIAGNOSIS — G89.29 CHRONIC RIGHT SHOULDER PAIN: Primary | ICD-10-CM

## 2020-06-11 DIAGNOSIS — M25.511 CHRONIC RIGHT SHOULDER PAIN: Primary | ICD-10-CM

## 2020-06-11 DIAGNOSIS — M25.611 STIFFNESS OF RIGHT SHOULDER JOINT: ICD-10-CM

## 2020-06-11 PROCEDURE — 97110 THERAPEUTIC EXERCISES: CPT

## 2020-06-11 PROCEDURE — 97112 NEUROMUSCULAR REEDUCATION: CPT

## 2020-06-17 ENCOUNTER — APPOINTMENT (OUTPATIENT)
Dept: PHYSICAL THERAPY | Facility: CLINIC | Age: 60
End: 2020-06-17
Payer: COMMERCIAL

## 2020-06-30 ENCOUNTER — OFFICE VISIT (OUTPATIENT)
Dept: PHYSICAL THERAPY | Facility: CLINIC | Age: 60
End: 2020-06-30
Payer: COMMERCIAL

## 2020-06-30 DIAGNOSIS — G89.29 CHRONIC RIGHT SHOULDER PAIN: Primary | ICD-10-CM

## 2020-06-30 DIAGNOSIS — M25.611 STIFFNESS OF RIGHT SHOULDER JOINT: ICD-10-CM

## 2020-06-30 DIAGNOSIS — M54.2 NECK PAIN: ICD-10-CM

## 2020-06-30 DIAGNOSIS — M25.511 CHRONIC RIGHT SHOULDER PAIN: Primary | ICD-10-CM

## 2020-06-30 PROCEDURE — 97110 THERAPEUTIC EXERCISES: CPT

## 2020-07-02 ENCOUNTER — OFFICE VISIT (OUTPATIENT)
Dept: PHYSICAL THERAPY | Facility: CLINIC | Age: 60
End: 2020-07-02
Payer: COMMERCIAL

## 2020-07-02 DIAGNOSIS — M25.611 STIFFNESS OF RIGHT SHOULDER JOINT: ICD-10-CM

## 2020-07-02 DIAGNOSIS — M54.2 NECK PAIN: ICD-10-CM

## 2020-07-02 DIAGNOSIS — M25.511 CHRONIC RIGHT SHOULDER PAIN: Primary | ICD-10-CM

## 2020-07-02 DIAGNOSIS — G89.29 CHRONIC RIGHT SHOULDER PAIN: Primary | ICD-10-CM

## 2020-07-02 PROCEDURE — 97140 MANUAL THERAPY 1/> REGIONS: CPT

## 2020-07-02 PROCEDURE — 97110 THERAPEUTIC EXERCISES: CPT

## 2020-07-02 NOTE — PROGRESS NOTES
Daily Note     Today's date: 2020  Patient name: Kenny Patel  : 1960  MRN: [id number]  Referring provider: Dayton Diez DO  Dx:   Encounter Diagnosis     ICD-10-CM    1  Chronic right shoulder pain M25 511     G89 29    2  Stiffness of right shoulder joint M25 611    3  Neck pain M54 2                   Subjective: Patient states he felt trisha soreness after progressions made last visit  Objective: See treatment diary below      Assessment: Tolerated treatment well  Initiated POC on UBE this visit  Introduced seated throacic extension and sidelying abduction with good tolerance  Patient continues to have a "twinge" into abduction which he rates as a 2/10 pain  Progressed patient to BTB and demonstrated increase in challenge  Mobs performed by Елена Lewis DPT  Following mobs patient was able to gain increase motion into all directions with PROM  Provided patient with BTB for at home and to take on his trip next week  Patient demonstrated fatigue post treatment, exhibited good technique with therapeutic exercises and would benefit from continued PT      Plan: Continue per plan of care        Manual     R GH jt inferior, posterior, distraction SMF SMF NV SMF    R scap mobs SMF SMF NV SMF    TPR to anterior shoulder (bicep, teres major) SMF RA RA RA PROM                    Exercise Diary         Upper trap, Levator scap, SCM stretch c MH 10 sec x 3 (upper trap only) 30 sec x 3  30 sec x 3 30 sec x 3    Chin tucks 5 sec x 10       Cane ER stretch c MH 5 sec x 10 5 sec x 15 5 sec x 10  5 sec x 15    Scap Retraction -> TB 5 sec x 10 5 sec x15 OTB  2x10  BTB 2x10    TB B S' Ext  OTB 2x10 OTB 2x10 BTB 2x10     SNAGs C/s Ext, Rotation  5 sec x15 5 sec x 15  5 sec x 15    Seated Thoracic Ext -> Foam Roller    YMB 5" 2x10     Scap Depression        Pec stretch; Bicep Stretch  20 sec x 3  20 sec x 3  20 sec x 3            Supine B ER; B Horiz ABD -> Standing        Sidelying ER c towell roll   2x10 2x10    Sidelying ABD    2x10    Prone Scap Retraction        Prone T, I   2x10 ea 2x10            Pulleys (flex, scap, ABD)  5 sec x15 ea 5 sec x 15 ea 5 sec x15 ea    B Scaption   2# 2x10  2# 3x10    UBE  NV 5' 5'             Modalities        MH/CP 10 mins MH c stretching 10 min  c stretching NV                          Skin checks performed pre and post application: intact

## 2020-07-06 ENCOUNTER — APPOINTMENT (OUTPATIENT)
Dept: PHYSICAL THERAPY | Facility: CLINIC | Age: 60
End: 2020-07-06
Payer: COMMERCIAL

## 2020-07-07 ENCOUNTER — OFFICE VISIT (OUTPATIENT)
Dept: PHYSICAL THERAPY | Facility: CLINIC | Age: 60
End: 2020-07-07
Payer: COMMERCIAL

## 2020-07-07 DIAGNOSIS — M25.511 CHRONIC RIGHT SHOULDER PAIN: Primary | ICD-10-CM

## 2020-07-07 DIAGNOSIS — M54.2 NECK PAIN: ICD-10-CM

## 2020-07-07 DIAGNOSIS — M25.611 STIFFNESS OF RIGHT SHOULDER JOINT: ICD-10-CM

## 2020-07-07 DIAGNOSIS — G89.29 CHRONIC RIGHT SHOULDER PAIN: Primary | ICD-10-CM

## 2020-07-07 PROCEDURE — 97140 MANUAL THERAPY 1/> REGIONS: CPT

## 2020-07-07 PROCEDURE — 97110 THERAPEUTIC EXERCISES: CPT

## 2020-07-07 NOTE — PROGRESS NOTES
Daily Note     Today's date: 2020  Patient name: Toño Asher  : 1960  MRN: [id number]  Referring provider: Ingrid Phillip DO  Dx:   Encounter Diagnosis     ICD-10-CM    1  Chronic right shoulder pain M25 511     G89 29    2  Stiffness of right shoulder joint M25 611    3  Neck pain M54 2                   Subjective: Patient states he is feeling much better  Objective: See treatment diary below      Assessment: Tolerated treatment well  Continued with POC this visit due to patient feeling much better today  He continued to do well with each exercises needing minimal cues for corrected  Introduced TB ER, attempted TB abduction but patient had increase in pain therefore did not complete  Mobs performed by María Hanna DPT  Patient demonstrated fatigue post treatment, exhibited good technique with therapeutic exercises and would benefit from continued PT      Plan: Continue per plan of care        Manual    R GH jt inferior, posterior, distraction SMF SMF NV SMF SMF   R scap mobs SMF SMF NV SMF SMF   TPR to anterior shoulder (bicep, teres major) SMF RA RA RA PROM                    Exercise Diary         Upper trap, Levator scap, SCM stretch c MH 10 sec x 3 (upper trap only) 30 sec x 3  30 sec x 3 30 sec x 3 HEP   Chin tucks 5 sec x 10       Cane ER stretch c MH 5 sec x 10 5 sec x 15 5 sec x 10  5 sec x 15 5 sec x 15    Scap Retraction -> TB 5 sec x 10 5 sec x15 OTB  2x10  BTB 2x10 BTB 2x10   TB B S' Ext  OTB 2x10 OTB 2x10 BTB 2x10  BTB 2x10   SNAGs C/s Ext, Rotation  5 sec x15 5 sec x 15  5 sec x 15 5 sec x 15   Seated Thoracic Ext -> Foam Roller    YMB 5" 2x10  YMB 5" 2x10   Scap Depression        Pec stretch; Bicep Stretch  20 sec x 3  20 sec x 3  20 sec x 3 HEP           Supine B ER; B Horiz ABD -> Standing        Sidelying ER c towell roll   2x10 2x10 2x10   Sidelying ABD    2x10 2x10   Prone Scap Retraction        Prone T, I   2x10 ea 2x10 2x10           Pulleys (flex, scap, ABD)  5 sec x15 ea 5 sec x 15 ea 5 sec x15 ea 5 sec x 15 ea   B Scaption   2# 2x10  2# 3x10 2# 3x10   UBE  NV 5' 5'  5'            Modalities        MH/CP 10 mins MH c stretching 10 min  c stretching NV                          Skin checks performed pre and post application: intact

## 2020-07-09 ENCOUNTER — APPOINTMENT (OUTPATIENT)
Dept: PHYSICAL THERAPY | Facility: CLINIC | Age: 60
End: 2020-07-09
Payer: COMMERCIAL

## 2020-07-10 ENCOUNTER — OFFICE VISIT (OUTPATIENT)
Dept: PHYSICAL THERAPY | Facility: CLINIC | Age: 60
End: 2020-07-10
Payer: COMMERCIAL

## 2020-07-10 DIAGNOSIS — M54.2 NECK PAIN: ICD-10-CM

## 2020-07-10 DIAGNOSIS — M25.511 CHRONIC RIGHT SHOULDER PAIN: Primary | ICD-10-CM

## 2020-07-10 DIAGNOSIS — G89.29 CHRONIC RIGHT SHOULDER PAIN: Primary | ICD-10-CM

## 2020-07-10 DIAGNOSIS — M25.611 STIFFNESS OF RIGHT SHOULDER JOINT: ICD-10-CM

## 2020-07-10 PROCEDURE — 97110 THERAPEUTIC EXERCISES: CPT

## 2020-07-10 PROCEDURE — 97112 NEUROMUSCULAR REEDUCATION: CPT

## 2020-07-10 NOTE — PROGRESS NOTES
Daily Note     Today's date: 7/10/2020  Patient name: Travis Fontenot  : 1960  MRN: [id number]  Referring provider: Evy Connor DO  Dx:   Encounter Diagnosis     ICD-10-CM    1  Chronic right shoulder pain M25 511     G89 29    2  Stiffness of right shoulder joint M25 611    3  Neck pain M54 2                   Subjective: Patient states he is still feeling great  Objective: See treatment diary below      Assessment: Tolerated treatment well  Initiated POC with pulleys due to UBE being in use  Continued to do well with therapy  He still gets most of his pain into shoulder abduction  He had increase in pain with standing theraband abduction, therefore trialed in supine and had much better tolerance  Mobs performed by LESLIE BeckerT  Concluded with UBE  Patient demonstrated fatigue post treatment, exhibited good technique with therapeutic exercises and would benefit from continued PT      Plan: Continue per plan of care        Manual 7/10 6/11 6/30 7/2 7/7   R GH jt inferior, posterior, distraction SMF SMF NV SMF SMF   R scap mobs SMF SMF NV SMF SMF   TPR to anterior shoulder (bicep, teres major) RA TPR RA RA RA PROM RA TPR                   Exercise Diary         Upper trap, Levator scap, SCM stretch c MH HEP 30 sec x 3  30 sec x 3 30 sec x 3 HEP   Chin tucks        Cane ER stretch c MH  5 sec x 15 5 sec x 10  5 sec x 15 5 sec x 15    Scap Retraction -> TB BTB 3x10 5 sec x15 OTB  2x10  BTB 2x10 BTB 2x10   TB B S' Ext BTB 3x10 OTB 2x10 OTB 2x10 BTB 2x10  BTB 2x10   SNAGs C/s Ext, Rotation 5 sec x 15 5 sec x15 5 sec x 15  5 sec x 15 5 sec x 15   Seated Thoracic Ext -> Foam Roller YMB 5" 2x10    YMB 5" 2x10  YMB 5" 2x10   Scap Depression        Pec stretch; Bicep Stretch HEP 20 sec x 3  20 sec x 3  20 sec x 3 HEP           Supine B ER; B Horiz ABD -> Standing Standing ER   BTB 3x10  Supine ABD OTB x10        Sidelying ER c towell roll 2x10  2x10 2x10 2x10   Sidelying ABD 2x10   2x10 2x10   Prone Scap Retraction        Prone T, I 3x10   2x10 ea 2x10 2x10           Pulleys (flex, scap, ABD) 5 sec x 15 ea 5 sec x15 ea 5 sec x 15 ea 5 sec x15 ea 5 sec x 15 ea   B Scaption 2# 3x10  2# 2x10  2# 3x10 2# 3x10   UBE 5' NV 5' 5'  5'            Modalities        MH/CP  10 min MH c stretching NV                          Skin checks performed pre and post application: intact

## 2020-07-13 ENCOUNTER — OFFICE VISIT (OUTPATIENT)
Dept: PHYSICAL THERAPY | Facility: CLINIC | Age: 60
End: 2020-07-13
Payer: COMMERCIAL

## 2020-07-13 DIAGNOSIS — M25.511 CHRONIC RIGHT SHOULDER PAIN: Primary | ICD-10-CM

## 2020-07-13 DIAGNOSIS — G89.29 CHRONIC RIGHT SHOULDER PAIN: Primary | ICD-10-CM

## 2020-07-13 DIAGNOSIS — M54.2 NECK PAIN: ICD-10-CM

## 2020-07-13 DIAGNOSIS — M25.611 STIFFNESS OF RIGHT SHOULDER JOINT: ICD-10-CM

## 2020-07-13 PROCEDURE — 97140 MANUAL THERAPY 1/> REGIONS: CPT | Performed by: PHYSICAL THERAPIST

## 2020-07-13 PROCEDURE — 97110 THERAPEUTIC EXERCISES: CPT | Performed by: PHYSICAL THERAPIST

## 2020-07-13 NOTE — PROGRESS NOTES
Daily Note     Today's date: 2020  Patient name: Kenisha Dunn  : 1960  MRN: [id number]  Referring provider: Xochitl Talavera DO  Dx:   Encounter Diagnosis     ICD-10-CM    1  Chronic right shoulder pain M25 511     G89 29    2  Stiffness of right shoulder joint M25 611    3  Neck pain M54 2                   Subjective: Pt reports that overall his shoulder is feeling better however certain motions still continue to cause discomfort  Objective: See treatment diary below      Assessment: Tolerated treatment well; initiated UBE retrograde 5 minutes  VCs provided on proper sequencing with exercises; added wall ball circles and scap depression  He continues to have the most discomfort with abduction motions  Patient demonstrated fatigue post treatment and would benefit from continued PT      Plan: Continue per plan of care        Manual 7/10 7/13      R GH jt inferior, posterior, distraction SMF SMF      R scap mobs SMF SMF      TPR to anterior shoulder (bicep, teres major) RA TPR SMF      R PROM all planes  SMF              Exercise Diary         Upper trap, Levator scap, SCM stretch c MH HEP       Chin tucks        Cane ER stretch c MH        Scap Retraction -> TB BTB 3x10 MTB 3x10      TB B S' Ext BTB 3x10 MTB 30      SNAGs C/s Ext, Rotation 5 sec x 15 5 sec x 15      Seated Thoracic Ext -> Foam Roller YMB 5" 2x10  YMB 5" 3x10      Wall Ball circles (cw, ccw)  2# 10 (cw, ccw)      Scap Depression  2 Blk TB 2x10      Pec stretch; Bicep Stretch HEP               Supine B ER; B Horiz ABD -> Standing Standing ER   BTB 3x10  Supine ABD OTB x10  Standing ER BTB 3x10; Supine Horiz ABD 2x10      Sidelying ER c towell roll 2x10 2# 2x10      Sidelying ABD 2x10 1# 2x10       Prone Scap Retraction        Prone T, I 3x10  0# 3x10              Pulleys (flex, scap, ABD) 5 sec x 15 ea 5 sec x 20 ea      B Scaption 2# 3x10 2# 3x10       UBE (retrograde) 5' 5'              Modalities        MH/CP Skin checks performed pre and post application: intact

## 2020-07-16 ENCOUNTER — OFFICE VISIT (OUTPATIENT)
Dept: PHYSICAL THERAPY | Facility: CLINIC | Age: 60
End: 2020-07-16
Payer: COMMERCIAL

## 2020-07-16 DIAGNOSIS — M25.611 STIFFNESS OF RIGHT SHOULDER JOINT: ICD-10-CM

## 2020-07-16 DIAGNOSIS — M54.2 NECK PAIN: ICD-10-CM

## 2020-07-16 DIAGNOSIS — G89.29 CHRONIC RIGHT SHOULDER PAIN: Primary | ICD-10-CM

## 2020-07-16 DIAGNOSIS — M25.511 CHRONIC RIGHT SHOULDER PAIN: Primary | ICD-10-CM

## 2020-07-16 PROCEDURE — 97110 THERAPEUTIC EXERCISES: CPT | Performed by: PHYSICAL THERAPIST

## 2020-07-16 PROCEDURE — 97140 MANUAL THERAPY 1/> REGIONS: CPT | Performed by: PHYSICAL THERAPIST

## 2020-07-16 NOTE — PROGRESS NOTES
Daily Note     Today's date: 2020  Patient name: Calderon Max  : 1960  MRN: [id number]  Referring provider: Doug Bee DO  Dx:   Encounter Diagnosis     ICD-10-CM    1  Chronic right shoulder pain M25 511     G89 29    2  Stiffness of right shoulder joint M25 611    3  Neck pain M54 2                   Subjective: Patient states he is doing well today  Reports he continues to notice improvements pain  Objective: See treatment diary below      Assessment: Tolerated treatment well; initiated POC with UBE f/b VCs provided on proper sequencing with exercises  He continues to have limitations with strengthening exercises in abduction with crepitus noted  During MT especially with [address] jt mobilizations, noted more crepitus today in his shoulder from his joint possibly due to R [address] jt OA  Therapist discussed proper responses to exercises  Patient demonstrated fatigue post treatment and would benefit from continued PT      Plan: Continue per plan of care        Manual 7/10 7/13 7/16     R GH jt inferior, posterior, distraction SMF SMF SMF     R scap mobs SMF SMF SMF     TPR to anterior shoulder (bicep, teres major) RA TPR SMF SMF     R PROM all planes  SMF SMF             Exercise Diary         Upper trap, Levator scap, SCM stretch c MH HEP       Chin tucks        Cane ER stretch c MH        Scap Retraction -> TB BTB 3x10 MTB 3x10 MTB 30     TB B S' Ext BTB 3x10 MTB 30 MTB 30     SNAGs C/s Ext, Rotation 5 sec x 15 5 sec x 15 5 sec x 20     Seated Thoracic Ext -> Foam Roller YMB 5" 2x10  YMB 5" 3x10 YMB 5" 30     Wall Ball circles (cw, ccw)  2# 10 (cw, ccw) 2# 10 (cw, ccw)     Scap Depression  2 Blk TB 2x10 2 BLK TB 30x     Pec stretch; Bicep Stretch HEP               Supine B ER; B Horiz ABD -> Standing Standing ER   BTB 3x10  Supine ABD OTB x10  Standing ER BTB 3x10; Supine Horiz ABD 2x10 Standing ER BTB 3x10; Supine Horiz ABD 2x10 c OTB     Sidelying ER c towell roll 2x10 2# 2x10 2# 3x10     Sidelying ABD 2x10 1# 2x10  HOLD     Prone Scap Retraction        Prone T, I 3x10  0# 3x10 2# 3x10             Pulleys (flex, scap, ABD) 5 sec x 15 ea 5 sec x 20 ea 5 sec x 20 ea     B Scaption 2# 3x10 2# 3x10  3# 3x10     UBE (retrograde) 5' 5' 5'             Modalities        MH/CP                             Skin checks performed pre and post application: intact

## 2020-07-20 ENCOUNTER — APPOINTMENT (OUTPATIENT)
Dept: PHYSICAL THERAPY | Facility: CLINIC | Age: 60
End: 2020-07-20
Payer: COMMERCIAL

## 2020-07-21 ENCOUNTER — OFFICE VISIT (OUTPATIENT)
Dept: PHYSICAL THERAPY | Facility: CLINIC | Age: 60
End: 2020-07-21
Payer: COMMERCIAL

## 2020-07-21 DIAGNOSIS — M25.511 CHRONIC RIGHT SHOULDER PAIN: Primary | ICD-10-CM

## 2020-07-21 DIAGNOSIS — M54.2 NECK PAIN: ICD-10-CM

## 2020-07-21 DIAGNOSIS — G89.29 CHRONIC RIGHT SHOULDER PAIN: Primary | ICD-10-CM

## 2020-07-21 DIAGNOSIS — M25.611 STIFFNESS OF RIGHT SHOULDER JOINT: ICD-10-CM

## 2020-07-21 PROCEDURE — 97140 MANUAL THERAPY 1/> REGIONS: CPT

## 2020-07-21 PROCEDURE — 97112 NEUROMUSCULAR REEDUCATION: CPT

## 2020-07-21 PROCEDURE — 97110 THERAPEUTIC EXERCISES: CPT

## 2020-07-21 NOTE — PROGRESS NOTES
Daily Note     Today's date: 2020  Patient name: Antonio Yanes  : 1960  MRN: [id number]  Referring provider: Luis Beatty DO  Dx:   Encounter Diagnosis     ICD-10-CM    1  Chronic right shoulder pain M25 511     G89 29    2  Stiffness of right shoulder joint M25 611    3  Neck pain M54 2                   Subjective: Patient states he is doing well  Objective: See treatment diary below      Assessment: Tolerated treatment well  Continued with current POC this visit  Patient is still limited with shoulder abduction due to pain, therefore held exercises for abduction  Presented with good form with exercises needing minimal cues for correction  Mobs performed by Lynda Ellis DPT  Patient demonstrated fatigue post treatment, exhibited good technique with therapeutic exercises and would benefit from continued PT      Plan: Continue per plan of care        Manual 7/10 7/13 7/16 7/21    R GH jt inferior, posterior, distraction SMF SMF SMF BG    R scap mobs SMF SMF SMF BG    TPR to anterior shoulder (bicep, teres major) RA TPR SMF SMF RA    R PROM all planes  SMF SMF RA            Exercise Diary         Upper trap, Levator scap, SCM stretch c MH HEP       Chin tucks        Cane ER stretch c MH        Scap Retraction -> TB BTB 3x10 MTB 3x10 MTB 30 MTB 30    TB B S' Ext BTB 3x10 MTB 30 MTB 30 MTB 30    SNAGs C/s Ext, Rotation 5 sec x 15 5 sec x 15 5 sec x 20 5 sec x 20    Seated Thoracic Ext -> Foam Roller YMB 5" 2x10  YMB 5" 3x10 YMB 5" 30 YMB 5" 30    Wall Ball circles (cw, ccw)  2# 10 (cw, ccw) 2# 10 (cw, ccw) 2# 10 cw ccw     Scap Depression  2 Blk TB 2x10 2 BLK TB 30x NV    Pec stretch; Bicep Stretch HEP               Supine B ER; B Horiz ABD -> Standing Standing ER   BTB 3x10  Supine ABD OTB x10  Standing ER BTB 3x10; Supine Horiz ABD 2x10 Standing ER BTB 3x10; Supine Horiz ABD 2x10 c OTB Standing ER BTB 3x10; Supine Horiz ABD 2x10 c OTB    Sidelying ER c towell roll 2x10 2# 2x10 2# 3x10 2# 3x10 Sidelying ABD 2x10 1# 2x10  HOLD HELD    Prone Scap Retraction        Prone T, I 3x10  0# 3x10 2# 3x10 2# 3x10            Pulleys (flex, scap, ABD) 5 sec x 15 ea 5 sec x 20 ea 5 sec x 20 ea 5 sec x 15 ea     B Scaption 2# 3x10 2# 3x10  3# 3x10 3# 3x10    UBE (retrograde) 5' 5' 5' 5'            Modalities        MH/CP                             Skin checks performed pre and post application: intact

## 2020-07-23 ENCOUNTER — OFFICE VISIT (OUTPATIENT)
Dept: PHYSICAL THERAPY | Facility: CLINIC | Age: 60
End: 2020-07-23
Payer: COMMERCIAL

## 2020-07-23 DIAGNOSIS — G89.29 CHRONIC RIGHT SHOULDER PAIN: Primary | ICD-10-CM

## 2020-07-23 DIAGNOSIS — M54.2 NECK PAIN: ICD-10-CM

## 2020-07-23 DIAGNOSIS — M25.511 CHRONIC RIGHT SHOULDER PAIN: Primary | ICD-10-CM

## 2020-07-23 DIAGNOSIS — M25.611 STIFFNESS OF RIGHT SHOULDER JOINT: ICD-10-CM

## 2020-07-23 PROCEDURE — 97110 THERAPEUTIC EXERCISES: CPT

## 2020-07-23 NOTE — PROGRESS NOTES
Daily Note     Today's date: 2020  Patient name: Miya Cm  : 1960  MRN: [id number]  Referring provider: Cesilia Donato DO  Dx:   Encounter Diagnosis     ICD-10-CM    1  Chronic right shoulder pain M25 511     G89 29    2  Stiffness of right shoulder joint M25 611    3  Neck pain M54 2                   Subjective: Patient reports mild soreness from last visit  Objective: See treatment diary below      Assessment: Tolerated treatment well  Progressed patient to YMB with thoracic extensions and wall circles  Continued with remaining POC as normal due to continued soreness  He did well with all exercises  Mobs not preformed this visit  Patient demonstrated fatigue post treatment, exhibited good technique with therapeutic exercises and would benefit from continued PT      Plan: Continue per plan of care        Manual 7/10 7/13 7/16 7/21 7/23   R GH jt inferior, posterior, distraction SMF SMF SMF BG NV   R scap mobs SMF SMF SMF BG NV   TPR to anterior shoulder (bicep, teres major) RA TPR SMF SMF RA np    R PROM all planes  SMF SMF RA RA           Exercise Diary         Upper trap, Levator scap, SCM stretch c MH HEP       Chin tucks        Cane ER stretch c MH        Scap Retraction -> TB BTB 3x10 MTB 3x10 MTB 30 MTB 30 MTB 30   TB B S' Ext BTB 3x10 MTB 30 MTB 30 MTB 30 MTB 30   SNAGs C/s Ext, Rotation 5 sec x 15 5 sec x 15 5 sec x 20 5 sec x 20 5 sec x 20   Seated Thoracic Ext -> Foam Roller YMB 5" 2x10  YMB 5" 3x10 YMB 5" 30 YMB 5" 30 RMB 5" x 20   Wall Ball circles (cw, ccw)  2# 10 (cw, ccw) 2# 10 (cw, ccw) 2# 10 cw ccw  4# 10 cw ccw   Scap Depression  2 Blk TB 2x10 2 BLK TB 30x NV 2 BLK TB 30x   Pec stretch; Bicep Stretch HEP               Supine B ER; B Horiz ABD -> Standing Standing ER   BTB 3x10  Supine ABD OTB x10  Standing ER BTB 3x10; Supine Horiz ABD 2x10 Standing ER BTB 3x10; Supine Horiz ABD 2x10 c OTB Standing ER BTB 3x10; Supine Horiz ABD 2x10 c OTB Standing ER BTB 3x10; Supine Horiz ABD 2x10 c OTB   Sidelying ER c towell roll 2x10 2# 2x10 2# 3x10 2# 3x10    Sidelying ABD 2x10 1# 2x10  HOLD HELD    Prone Scap Retraction        Prone T, I 3x10  0# 3x10 2# 3x10 2# 3x10 I ##           Pulleys (flex, scap, ABD) 5 sec x 15 ea 5 sec x 20 ea 5 sec x 20 ea 5 sec x 15 ea  5 sec x 20 ea    B Scaption 2# 3x10 2# 3x10  3# 3x10 3# 3x10 3# 3x10   UBE (retrograde) 5' 5' 5' 5' 5'            Modalities        MH/CP                             Skin checks performed pre and post application: intact

## 2020-07-28 ENCOUNTER — OFFICE VISIT (OUTPATIENT)
Dept: PHYSICAL THERAPY | Facility: CLINIC | Age: 60
End: 2020-07-28
Payer: COMMERCIAL

## 2020-07-28 DIAGNOSIS — M25.611 STIFFNESS OF RIGHT SHOULDER JOINT: ICD-10-CM

## 2020-07-28 DIAGNOSIS — G89.29 CHRONIC RIGHT SHOULDER PAIN: Primary | ICD-10-CM

## 2020-07-28 DIAGNOSIS — M54.2 NECK PAIN: ICD-10-CM

## 2020-07-28 DIAGNOSIS — M25.511 CHRONIC RIGHT SHOULDER PAIN: Primary | ICD-10-CM

## 2020-07-28 PROCEDURE — 97110 THERAPEUTIC EXERCISES: CPT

## 2020-07-28 NOTE — PROGRESS NOTES
Daily Note     Today's date: 2020  Patient name: Yoel Benson  : 1960  MRN: [id number]  Referring provider: Ki Wanye DO  Dx:   Encounter Diagnosis     ICD-10-CM    1  Chronic right shoulder pain M25 511     G89 29    2  Stiffness of right shoulder joint M25 611    3  Neck pain M54 2                   Subjective: Patient states he is feeling good  Objective: See treatment diary below      Assessment: Tolerated treatment well  Initiated POC on pulleys due to UBE in use  Progressed patient to 4# with active scaption and MTB with shoulder ER  Mobs performed by Eula Short, DPT  Introduced sleep stretch to increase IR ROM  Patient demonstrated fatigue post treatment, exhibited good technique with therapeutic exercises and would benefit from continued PT      Plan: Continue per plan of care        Manual    R GH jt inferior, posterior, distraction JZ SMF SMF BG NV   R scap mobs JZ SMF SMF BG NV   TPR to anterior shoulder (bicep, teres major) np SMF SMF RA np    R PROM all planes RA SMF SMF RA RA           Exercise Diary         Upper trap, Levator scap, SCM stretch c MH HEP       Sleeper stretch 10"x3        Cane ER stretch c MH        Scap Retraction -> TB MTB 30 MTB 3x10 MTB 30 MTB 30 MTB 30   TB B S' Ext MTB 30 MTB 30 MTB 30 MTB 30 MTB 30   SNAGs C/s Ext, Rotation 5 sec 20  5 sec x 15 5 sec x 20 5 sec x 20 5 sec x 20   Seated Thoracic Ext -> Foam Roller RMB 5" x 20 YMB 5" 3x10 YMB 5" 30 YMB 5" 30 RMB 5" x 20   Wall Ball circles (cw, ccw) 4# 10 cw ccw 2# 10 (cw, ccw) 2# 10 (cw, ccw) 2# 10 cw ccw  4# 10 cw ccw   Scap Depression  2 Blk TB 2x10 2 BLK TB 30x NV 2 BLK TB 30x   Pec stretch; Bicep Stretch                Supine B ER; B Horiz ABD -> Standing Stand ER MTB 3x10  Supine Horiz ABD 2x10 c  Standing ER BTB 3x10; Supine Horiz ABD 2x10 Standing ER BTB 3x10; Supine Horiz ABD 2x10 c OTB Standing ER BTB 3x10; Supine Horiz ABD 2x10 c OTB Standing ER BTB 3x10; Supine Horiz ABD 2x10 c OTB   Sidelying ER c towell roll 3# 2x10  2# 2x10 2# 3x10 2# 3x10    Sidelying ABD HOLD 1# 2x10  HOLD HELD    Prone Scap Retraction        Prone T, I 3# 2x10  0# 3x10 2# 3x10 2# 3x10 I ##           Pulleys (flex, scap, ABD) 5 sec x 20 ea 5 sec x 20 ea 5 sec x 20 ea 5 sec x 15 ea  5 sec x 20 ea    B Scaption 4# 2x10  2# 3x10  3# 3x10 3# 3x10 3# 3x10   UBE (retrograde)  5' 5' 5' 5'            Modalities        MH/CP                             Skin checks performed pre and post application: intact

## 2020-07-30 ENCOUNTER — APPOINTMENT (OUTPATIENT)
Dept: PHYSICAL THERAPY | Facility: CLINIC | Age: 60
End: 2020-07-30
Payer: COMMERCIAL

## 2020-08-04 ENCOUNTER — APPOINTMENT (OUTPATIENT)
Dept: PHYSICAL THERAPY | Facility: CLINIC | Age: 60
End: 2020-08-04
Payer: COMMERCIAL

## 2020-08-11 ENCOUNTER — APPOINTMENT (OUTPATIENT)
Dept: PHYSICAL THERAPY | Facility: CLINIC | Age: 60
End: 2020-08-11
Payer: COMMERCIAL

## 2020-08-13 ENCOUNTER — OFFICE VISIT (OUTPATIENT)
Dept: PHYSICAL THERAPY | Facility: CLINIC | Age: 60
End: 2020-08-13
Payer: COMMERCIAL

## 2020-08-13 DIAGNOSIS — M25.611 STIFFNESS OF RIGHT SHOULDER JOINT: ICD-10-CM

## 2020-08-13 DIAGNOSIS — G89.29 CHRONIC RIGHT SHOULDER PAIN: Primary | ICD-10-CM

## 2020-08-13 DIAGNOSIS — M25.511 CHRONIC RIGHT SHOULDER PAIN: Primary | ICD-10-CM

## 2020-08-13 DIAGNOSIS — M54.2 NECK PAIN: ICD-10-CM

## 2020-08-13 PROCEDURE — 97110 THERAPEUTIC EXERCISES: CPT

## 2020-08-13 NOTE — PROGRESS NOTES
Daily Note     Today's date: 2020  Patient name: Beltran Flood  : 1960  MRN: [id number]  Referring provider: Kishor Valdes DO  Dx:   Encounter Diagnosis     ICD-10-CM    1  Chronic right shoulder pain  M25 511     G89 29    2  Stiffness of right shoulder joint  M25 611    3  Neck pain  M54 2                   Subjective: Patient states he is doing okay, reports he has not been any worse since not being in therapy for 2 weeks  Objective: See treatment diary below      Assessment: Tolerated treatment well  Continued with current POC due to patient missing therapy for 2 weeks  He tolerated everything well without complaints of pain  Demonstrated good range into all planes with PROM, but encouraged patient to keep stretching and completing HEP  Patient demonstrated fatigue post treatment, exhibited good technique with therapeutic exercises and would benefit from continued PT      Plan: Continue per plan of care        Manual    R GH jt inferior, posterior, distraction JZ  SMF BG NV   R scap mobs JZ  SMF BG NV   TPR to anterior shoulder (bicep, teres major) np  SMF RA np    R PROM all planes RA  SMF RA RA           Exercise Diary         Upper trap, Levator scap, SCM stretch c MH HEP       Sleeper stretch 10"x3        Cane ER stretch c MH        Scap Retraction -> TB MTB 30 MTB 20 MTB 30 MTB 30 MTB 30   TB B S' Ext MTB 30 MTB 2-  MTB 30 MTB 30 MTB 30   SNAGs C/s Ext, Rotation 5 sec 20  5 sec 20 5 sec x 20 5 sec x 20 5 sec x 20   Seated Thoracic Ext -> Foam Roller RMB 5" x 20 RMB 5" x 20  YMB 5" 30 YMB 5" 30 RMB 5" x 20   Wall Ball circles (cw, ccw) 4# 10 cw ccw 4# 15 cw ccw 2# 10 (cw, ccw) 2# 10 cw ccw  4# 10 cw ccw   Scap Depression   2 BLK TB 30x NV 2 BLK TB 30x   Pec stretch; Bicep Stretch                Supine B ER; B Horiz ABD -> Standing Stand ER MTB 3x10  Supine Horiz ABD 2x10 c  Stand ER MTB 3x10  Supine Horiz ABD 2x10 c  Standing ER BTB 3x10; Supine Horiz ABD 2x10 c OTB Standing ER BTB 3x10; Supine Horiz ABD 2x10 c OTB Standing ER BTB 3x10; Supine Horiz ABD 2x10 c OTB   Sidelying ER c towell roll 3# 2x10  3# 2x10  2# 3x10 2# 3x10    Sidelying ABD HOLD  HOLD HELD    Prone Scap Retraction        Prone T, I 3# 2x10  3# 2x10 2# 3x10 2# 3x10 I ##           Pulleys (flex, scap, ABD) 5 sec x 20 ea 5 sec x 20 ea 5 sec x 20 ea 5 sec x 15 ea  5 sec x 20 ea    B Scaption 4# 2x10  4# 2x10  3# 3x10 3# 3x10 3# 3x10   UBE (retrograde) 5'  5' 5' 5'            Modalities        MH/CP                             Skin checks performed pre and post application: intact

## 2020-08-18 ENCOUNTER — OFFICE VISIT (OUTPATIENT)
Dept: PHYSICAL THERAPY | Facility: CLINIC | Age: 60
End: 2020-08-18
Payer: COMMERCIAL

## 2020-08-18 DIAGNOSIS — M25.611 STIFFNESS OF RIGHT SHOULDER JOINT: ICD-10-CM

## 2020-08-18 DIAGNOSIS — G89.29 CHRONIC RIGHT SHOULDER PAIN: Primary | ICD-10-CM

## 2020-08-18 DIAGNOSIS — M25.511 CHRONIC RIGHT SHOULDER PAIN: Primary | ICD-10-CM

## 2020-08-18 DIAGNOSIS — M54.2 NECK PAIN: ICD-10-CM

## 2020-08-18 PROCEDURE — 97110 THERAPEUTIC EXERCISES: CPT

## 2020-08-18 NOTE — PROGRESS NOTES
Daily Note     Today's date: 2020  Patient name: Joya Dixon  : 1960  MRN: [id number]  Referring provider: Johnson Martínez DO  Dx:   Encounter Diagnosis     ICD-10-CM    1  Chronic right shoulder pain  M25 511     G89 29    2  Stiffness of right shoulder joint  M25 611    3  Neck pain  M54 2                   Subjective: Patient states he is doing okay today  Objective: See treatment diary below      Assessment: Tolerated treatment well  Initiated POC on UBE  Progressed to 4# with shoulder ER  Trialed 4# with T's but experienced discomfort and pain  Provided a lower weight and had better tolerance  Mobs preformed by China Jones DPT  Patient demonstrated fatigue post treatment, exhibited good technique with therapeutic exercises and would benefit from continued PT      Plan: Continue per plan of care        Manual    R GH jt inferior, posterior, distraction JZ  BG BG NV   R scap mobs JZ  BG BG NV   TPR to anterior shoulder (bicep, teres major) np   RA np    R PROM all planes RA  RA RA RA           Exercise Diary         Upper trap, Levator scap, SCM stretch c MH HEP       Sleeper stretch 10"x3        Cane ER stretch c MH        Scap Retraction -> TB MTB 30 MTB 20 MTB 30x MTB 30 MTB 30   TB B S' Ext MTB 30 MTB 2-  MTB 30x MTB 30 MTB 30   SNAGs C/s Ext, Rotation 5 sec 20  5 sec 20  5 sec x 20 5 sec x 20   Seated Thoracic Ext -> Foam Roller RMB 5" x 20 RMB 5" x 20  RMB 5' x 20  YMB 5" 30 RMB 5" x 20   Wall Ball circles (cw, ccw) 4# 10 cw ccw 4# 15 cw ccw 4# 15 cw ccw 2# 10 cw ccw  4# 10 cw ccw   Scap Depression    NV 2 BLK TB 30x   Pec stretch; Bicep Stretch                Supine B ER; B Horiz ABD -> Standing Stand ER MTB 3x10  Supine Horiz ABD 2x10 c  Stand ER MTB 3x10  Supine Horiz ABD 2x10 c  Stand ER MTB 3x10   Supine horiz ABD 2x10  Standing ER BTB 3x10; Supine Horiz ABD 2x10 c OTB Standing ER BTB 3x10; Supine Horiz ABD 2x10 c OTB   Sidelying ER c towell roll 3# 2x10 3# 2x10  4# 3x10  2# 3x10    Sidelying ABD HOLD   HELD    Prone Scap Retraction        Prone T, I 3# 2x10  3# 2x10 4# 2x10  2# 3x10 I ##           Pulleys (flex, scap, ABD) 5 sec x 20 ea 5 sec x 20 ea 5 sec x 20 ea 5 sec x 15 ea  5 sec x 20 ea    B Scaption 4# 2x10  4# 2x10  4# 3x10  3# 3x10 3# 3x10   UBE (retrograde) 5'  5'  5' 5'            Modalities        MH/CP                             Skin checks performed pre and post application: intact

## 2020-08-20 ENCOUNTER — OFFICE VISIT (OUTPATIENT)
Dept: PHYSICAL THERAPY | Facility: CLINIC | Age: 60
End: 2020-08-20
Payer: COMMERCIAL

## 2020-08-20 DIAGNOSIS — M25.511 CHRONIC RIGHT SHOULDER PAIN: Primary | ICD-10-CM

## 2020-08-20 DIAGNOSIS — M54.2 NECK PAIN: ICD-10-CM

## 2020-08-20 DIAGNOSIS — G89.29 CHRONIC RIGHT SHOULDER PAIN: Primary | ICD-10-CM

## 2020-08-20 DIAGNOSIS — M25.611 STIFFNESS OF RIGHT SHOULDER JOINT: ICD-10-CM

## 2020-08-20 PROCEDURE — 97110 THERAPEUTIC EXERCISES: CPT

## 2020-08-20 NOTE — PROGRESS NOTES
Daily Note     Today's date: 2020  Patient name: Pilar Lopez  : 1960  MRN: [id number]  Referring provider: Mago Llamas DO  Dx:   Encounter Diagnosis     ICD-10-CM    1  Chronic right shoulder pain  M25 511     G89 29    2  Stiffness of right shoulder joint  M25 611    3  Neck pain  M54 2                   Subjective: Patient states he is feeling well today  Objective: See treatment diary below      Assessment: Tolerated treatment well  Continued to progress patient in reps this visit  Patient will be traveling to work next week, therefore will not see patient until   Provided patient updated theraband for trip  Encouraged patient to continue with HEP  He is demonstrating improvement in strength and ROM  He does still continue to have pain into shoulder abduction but he notes it has improved  Patient demonstrated fatigue post treatment, exhibited good technique with therapeutic exercises and would benefit from continued PT      Plan: Continue per plan of care        Manual    R GH jt inferior, posterior, distraction JZ  BG  NV   R scap mobs JZ  BG  NV   TPR to anterior shoulder (bicep, teres major) np    np    R PROM all planes RA  RA RA RA           Exercise Diary         Upper trap, Levator scap, SCM stretch c MH HEP       Sleeper stretch 10"x3        Cane ER stretch c MH        Scap Retraction -> TB MTB 30 MTB 20 MTB 30x MTB 30x  MTB 30   TB B S' Ext MTB 30 MTB 2-  MTB 30x MTB 30x MTB 30   SNAGs C/s Ext, Rotation 5 sec 20  5 sec 20   5 sec x 20   Seated Thoracic Ext -> Foam Roller RMB 5" x 20 RMB 5" x 20  RMB 5' x 20  RMB 5"x20  RMB 5" x 20   Wall Ball circles (cw, ccw) 4# 10 cw ccw 4# 15 cw ccw 4# 15 cw ccw 4# 2x10 cw ccw  4# 10 cw ccw   Scap Depression     2 BLK TB 30x   Pec stretch; Bicep Stretch                Supine B ER; B Horiz ABD -> Standing Stand ER MTB 3x10  Supine Horiz ABD 2x10 c  Stand ER MTB 3x10  Supine Horiz ABD 2x10 c  Stand ER MTB 3x10 Supine horiz ABD 2x10  Stand ER MTB 3x10   Supine horiz ABD 2x10  Standing ER BTB 3x10; Supine Horiz ABD 2x10 c OTB   Sidelying ER c towell roll 3# 2x10  3# 2x10  4# 3x10  4# 3x10    Sidelying ABD HOLD       Prone Scap Retraction        Prone T, I 3# 2x10  3# 2x10 4# 2x10  4# 3x10  I ##           Pulleys (flex, scap, ABD) 5 sec x 20 ea 5 sec x 20 ea 5 sec x 20 ea 5 sec x 20 ea 5 sec x 20 ea    B Scaption 4# 2x10  4# 2x10  4# 3x10  4# 3x10 3# 3x10   UBE (retrograde) 5'  5'  np  5'            Modalities        MH/CP                             Skin checks performed pre and post application: intact

## 2020-08-25 ENCOUNTER — APPOINTMENT (OUTPATIENT)
Dept: PHYSICAL THERAPY | Facility: CLINIC | Age: 60
End: 2020-08-25
Payer: COMMERCIAL

## 2020-08-27 ENCOUNTER — APPOINTMENT (OUTPATIENT)
Dept: PHYSICAL THERAPY | Facility: CLINIC | Age: 60
End: 2020-08-27
Payer: COMMERCIAL

## 2021-03-10 DIAGNOSIS — Z23 ENCOUNTER FOR IMMUNIZATION: ICD-10-CM

## 2021-03-19 ENCOUNTER — IMMUNIZATIONS (OUTPATIENT)
Dept: FAMILY MEDICINE CLINIC | Facility: HOSPITAL | Age: 61
End: 2021-03-19

## 2021-03-19 DIAGNOSIS — Z23 ENCOUNTER FOR IMMUNIZATION: Primary | ICD-10-CM

## 2021-03-19 PROCEDURE — 91300 SARS-COV-2 / COVID-19 MRNA VACCINE (PFIZER-BIONTECH) 30 MCG: CPT

## 2021-03-19 PROCEDURE — 0001A SARS-COV-2 / COVID-19 MRNA VACCINE (PFIZER-BIONTECH) 30 MCG: CPT

## 2021-04-12 ENCOUNTER — IMMUNIZATIONS (OUTPATIENT)
Dept: FAMILY MEDICINE CLINIC | Facility: HOSPITAL | Age: 61
End: 2021-04-12

## 2021-04-12 DIAGNOSIS — Z23 ENCOUNTER FOR IMMUNIZATION: Primary | ICD-10-CM

## 2021-04-12 PROCEDURE — 91300 SARS-COV-2 / COVID-19 MRNA VACCINE (PFIZER-BIONTECH) 30 MCG: CPT

## 2021-04-12 PROCEDURE — 0002A SARS-COV-2 / COVID-19 MRNA VACCINE (PFIZER-BIONTECH) 30 MCG: CPT

## 2021-05-14 DIAGNOSIS — Z13.0 SCREENING, ANEMIA, DEFICIENCY, IRON: ICD-10-CM

## 2021-05-14 DIAGNOSIS — Z12.5 SCREENING PSA (PROSTATE SPECIFIC ANTIGEN): ICD-10-CM

## 2021-05-14 DIAGNOSIS — Z13.29 SCREENING FOR THYROID DISORDER: ICD-10-CM

## 2021-05-14 DIAGNOSIS — Z11.59 ENCOUNTER FOR HEPATITIS C SCREENING TEST FOR LOW RISK PATIENT: ICD-10-CM

## 2021-05-14 DIAGNOSIS — Z13.1 SCREENING FOR DIABETES MELLITUS: ICD-10-CM

## 2021-05-14 DIAGNOSIS — I10 ESSENTIAL HYPERTENSION: ICD-10-CM

## 2021-05-14 DIAGNOSIS — Z13.220 SCREENING, LIPID: Primary | ICD-10-CM

## 2021-05-20 ENCOUNTER — TELEPHONE (OUTPATIENT)
Dept: FAMILY MEDICINE CLINIC | Facility: CLINIC | Age: 61
End: 2021-05-20

## 2021-05-20 ENCOUNTER — OFFICE VISIT (OUTPATIENT)
Dept: FAMILY MEDICINE CLINIC | Facility: CLINIC | Age: 61
End: 2021-05-20
Payer: COMMERCIAL

## 2021-05-20 VITALS
WEIGHT: 214 LBS | HEIGHT: 75 IN | TEMPERATURE: 97.1 F | HEART RATE: 87 BPM | SYSTOLIC BLOOD PRESSURE: 126 MMHG | BODY MASS INDEX: 26.61 KG/M2 | OXYGEN SATURATION: 96 % | DIASTOLIC BLOOD PRESSURE: 88 MMHG

## 2021-05-20 DIAGNOSIS — N40.1 BPH WITH OBSTRUCTION/LOWER URINARY TRACT SYMPTOMS: ICD-10-CM

## 2021-05-20 DIAGNOSIS — M53.3 PAIN OF BOTH SACROILIAC JOINTS: ICD-10-CM

## 2021-05-20 DIAGNOSIS — N13.8 BPH WITH OBSTRUCTION/LOWER URINARY TRACT SYMPTOMS: ICD-10-CM

## 2021-05-20 DIAGNOSIS — Z00.00 ANNUAL PHYSICAL EXAM: Primary | ICD-10-CM

## 2021-05-20 LAB
ALBUMIN SERPL-MCNC: 4.6 G/DL (ref 3.6–5.1)
ALBUMIN/GLOB SERPL: 2.2 (CALC) (ref 1–2.5)
ALP SERPL-CCNC: 55 U/L (ref 35–144)
ALT SERPL-CCNC: 28 U/L (ref 9–46)
AST SERPL-CCNC: 19 U/L (ref 10–35)
BASOPHILS # BLD AUTO: 22 CELLS/UL (ref 0–200)
BASOPHILS NFR BLD AUTO: 0.5 %
BILIRUB SERPL-MCNC: 0.9 MG/DL (ref 0.2–1.2)
BUN SERPL-MCNC: 20 MG/DL (ref 7–25)
BUN/CREAT SERPL: NORMAL (CALC) (ref 6–22)
CALCIUM SERPL-MCNC: 9.3 MG/DL (ref 8.6–10.3)
CHLORIDE SERPL-SCNC: 104 MMOL/L (ref 98–110)
CHOLEST SERPL-MCNC: 193 MG/DL
CHOLEST/HDLC SERPL: 4.5 (CALC)
CO2 SERPL-SCNC: 30 MMOL/L (ref 20–32)
CREAT SERPL-MCNC: 1.02 MG/DL (ref 0.7–1.25)
EOSINOPHIL # BLD AUTO: 22 CELLS/UL (ref 15–500)
EOSINOPHIL NFR BLD AUTO: 0.5 %
ERYTHROCYTE [DISTWIDTH] IN BLOOD BY AUTOMATED COUNT: 12.9 % (ref 11–15)
GLOBULIN SER CALC-MCNC: 2.1 G/DL (CALC) (ref 1.9–3.7)
GLUCOSE SERPL-MCNC: 99 MG/DL (ref 65–99)
HCT VFR BLD AUTO: 49.9 % (ref 38.5–50)
HCV RNA SERPL NAA+PROBE-ACNC: <15 IU/ML
HCV RNA SERPL NAA+PROBE-LOG IU: <1.18 LOG IU/ML
HDLC SERPL-MCNC: 43 MG/DL
HGB BLD-MCNC: 17.2 G/DL (ref 13.2–17.1)
LDLC SERPL CALC-MCNC: 129 MG/DL (CALC)
LYMPHOCYTES # BLD AUTO: 1192 CELLS/UL (ref 850–3900)
LYMPHOCYTES NFR BLD AUTO: 27.1 %
MCH RBC QN AUTO: 32.8 PG (ref 27–33)
MCHC RBC AUTO-ENTMCNC: 34.5 G/DL (ref 32–36)
MCV RBC AUTO: 95.2 FL (ref 80–100)
MONOCYTES # BLD AUTO: 273 CELLS/UL (ref 200–950)
MONOCYTES NFR BLD AUTO: 6.2 %
NEUTROPHILS # BLD AUTO: 2891 CELLS/UL (ref 1500–7800)
NEUTROPHILS NFR BLD AUTO: 65.7 %
NONHDLC SERPL-MCNC: 150 MG/DL (CALC)
PLATELET # BLD AUTO: 151 THOUSAND/UL (ref 140–400)
PMV BLD REES-ECKER: 11 FL (ref 7.5–12.5)
POTASSIUM SERPL-SCNC: 4.6 MMOL/L (ref 3.5–5.3)
PROT SERPL-MCNC: 6.7 G/DL (ref 6.1–8.1)
PSA FREE MFR SERPL: 19 % (CALC)
PSA FREE SERPL-MCNC: 0.3 NG/ML
PSA SERPL-MCNC: 1.6 NG/ML
RBC # BLD AUTO: 5.24 MILLION/UL (ref 4.2–5.8)
SL AMB EGFR AFRICAN AMERICAN: 91 ML/MIN/1.73M2
SL AMB EGFR NON AFRICAN AMERICAN: 78 ML/MIN/1.73M2
SODIUM SERPL-SCNC: 140 MMOL/L (ref 135–146)
TRIGL SERPL-MCNC: 99 MG/DL
TSH SERPL-ACNC: 0.71 MIU/L (ref 0.4–4.5)
WBC # BLD AUTO: 4.4 THOUSAND/UL (ref 3.8–10.8)

## 2021-05-20 PROCEDURE — 3008F BODY MASS INDEX DOCD: CPT | Performed by: FAMILY MEDICINE

## 2021-05-20 PROCEDURE — 99396 PREV VISIT EST AGE 40-64: CPT | Performed by: FAMILY MEDICINE

## 2021-05-20 PROCEDURE — 3725F SCREEN DEPRESSION PERFORMED: CPT | Performed by: FAMILY MEDICINE

## 2021-05-20 PROCEDURE — 1036F TOBACCO NON-USER: CPT | Performed by: FAMILY MEDICINE

## 2021-05-20 NOTE — TELEPHONE ENCOUNTER
----- Message from Rachel Dent DO sent at 5/20/2021  1:24 PM EDT -----  Labs are stable  Hep C is negative    PSA is normal   Continue current Rx No

## 2021-05-20 NOTE — PATIENT INSTRUCTIONS

## 2021-05-20 NOTE — PROGRESS NOTES
[address] [id number]    NAME: Perry Browne  AGE: 56 y o  SEX: male  : 1960     DATE: 2021     Assessment and Plan:     Problem List Items Addressed This Visit     None          Immunizations and preventive care screenings were discussed with patient today  Appropriate education was printed on patient's after visit summary  Counseling:  · Injury prevention: discussed safety/seat belts, safety helmets, smoke detectors, carbon dioxide detectors, and smoking near bedding or upholstery  BMI Counseling: Body mass index is 26 75 kg/m²  The BMI is above normal  Exercise recommendations include exercising 3-5 times per week  No follow-ups on file  Chief Complaint:     Chief Complaint   Patient presents with    Annual Exam     Pt here today for his annual exam  FBW due  History of Present Illness:     Adult Annual Physical   Patient here for a comprehensive physical exam  The patient reports problems - Sacroiliac pain  Diet and Physical Activity  · Diet/Nutrition: well balanced diet  · Exercise: 1-2 times a week on average  Depression Screening  PHQ-9 Depression Screening    PHQ-9:   Frequency of the following problems over the past two weeks:           General Health  · Sleep: sleeps well  · Hearing: normal - bilateral   · Vision: goes for regular eye exams  · Dental: regular dental visits   Health  · Symptoms include: nocturia     Review of Systems:     Review of Systems   Constitutional: Negative for activity change, appetite change, chills, diaphoresis, fatigue and unexpected weight change  HENT: Negative for congestion, ear discharge, ear pain, hearing loss, nosebleeds and rhinorrhea  Eyes: Negative for pain, redness, itching and visual disturbance  Respiratory: Negative for cough, choking, chest tightness and shortness of breath      Cardiovascular: Negative for chest pain and leg swelling  Gastrointestinal: Negative for abdominal pain, blood in stool, constipation, diarrhea and nausea  Endocrine: Negative for cold intolerance, polydipsia and polyphagia  Genitourinary: Negative for dysuria, frequency, hematuria and urgency  Musculoskeletal: Negative for arthralgias, back pain, gait problem, joint swelling, neck pain and neck stiffness  Skin: Negative for color change and rash  Allergic/Immunologic: Negative for environmental allergies and food allergies  Neurological: Negative for dizziness, tremors, seizures, speech difficulty, numbness and headaches  Hematological: Negative for adenopathy  Does not bruise/bleed easily  Psychiatric/Behavioral: Negative for behavioral problems, dysphoric mood, hallucinations and self-injury        Past Medical History:     Past Medical History:   Diagnosis Date    Bladder cancer ([facility] [address] )     BPH (benign prostatic hyperplasia)     Carotid bruit     History of hematuria     Hypertension       Past Surgical History:     Past Surgical History:   Procedure Laterality Date    COLONOSCOPY      NV LAP,INGUINAL HERNIA REPR,INITIAL Right 4/24/2018    Procedure: REPAIR HERNIA INGUINAL, LAPAROSCOPIC WITH MESH; POSSIBLE LEFT INGUINAL HERNIA;  Surgeon: Heidy Hurtado MD;  Location: [facility];  Service: General    NV REPAIR UMBILICAL BKFL,6+I/P,LYUFL Bilateral 4/24/2018    Procedure: REPAIR HERNIA UMBILICAL;  Surgeon: Heidy Hurtado MD;  Location: [facility];  Service: General    TURP VAPORIZATION        Family History:     Family History   Problem Relation Age of Onset    Hypertension Mother     Heart disease Father     Hyperlipidemia Father     Other Father         CABG    Breast cancer Sister     Diabetes Daughter       Social History:        Social History     Socioeconomic History    Marital status: /Civil Union     Spouse name: None    Number of children: None    Years of education: None    Highest education level: None Occupational History    None   Social Needs    Financial resource strain: None    Food insecurity     Worry: None     Inability: None    Transportation needs     Medical: None     Non-medical: None   Tobacco Use    Smoking status: Never Smoker    Smokeless tobacco: Never Used   Substance and Sexual Activity    Alcohol use: No    Drug use: No    Sexual activity: None   Lifestyle    Physical activity     Days per week: None     Minutes per session: None    Stress: None   Relationships    Social connections     Talks on phone: None     Gets together: None     Attends Restorationist service: None     Active member of club or organization: None     Attends meetings of clubs or organizations: None     Relationship status: None    Intimate partner violence     Fear of current or ex partner: None     Emotionally abused: None     Physically abused: None     Forced sexual activity: None   Other Topics Concern    None   Social History Narrative    None      Current Medications:     Current Outpatient Medications   Medication Sig Dispense Refill    Multiple Vitamin (MULTIVITAMIN) capsule Take 1 capsule by mouth daily       No current facility-administered medications for this visit  Allergies:     No Known Allergies   Physical Exam:     /88 (BP Location: Right arm, Patient Position: Sitting, Cuff Size: Large)   Pulse 87   Temp (!) 97 1 °F (36 2 °C) (Tympanic)   Ht 6' 3" (1 905 m)   Wt 97 1 kg (214 lb)   SpO2 96%   BMI 26 75 kg/m²     Physical Exam  Constitutional:       Appearance: He is well-developed  HENT:      Head: Normocephalic  Right Ear: External ear normal       Left Ear: External ear normal       Nose: Nose normal    Eyes:      Pupils: Pupils are equal, round, and reactive to light  Neck:      Musculoskeletal: Normal range of motion and neck supple  Cardiovascular:      Rate and Rhythm: Normal rate and regular rhythm  Heart sounds: Normal heart sounds     Pulmonary: Effort: Pulmonary effort is normal       Breath sounds: Normal breath sounds  Abdominal:      General: Bowel sounds are normal       Palpations: Abdomen is soft  Tenderness: There is no abdominal tenderness  Genitourinary:     Prostate: Normal    Musculoskeletal: Normal range of motion  Skin:     General: Skin is warm and dry  Neurological:      Mental Status: He is alert and oriented to person, place, and time  Deep Tendon Reflexes: Reflexes normal    Psychiatric:         Behavior: Behavior normal          Thought Content: Thought content normal          Judgment: Judgment normal       he is given exercises to perform for his sacroiliac tenderness  He can use ibuprofen as well  If his pain persists we will have him see physical therapy and consider dedicated films for his lumbar sacral spine      Nga Sandoval MA  [facility]

## (undated) DEVICE — REM POLYHESIVE ADULT PATIENT RETURN ELECTRODE: Brand: VALLEYLAB

## (undated) DEVICE — TUBING SUCTION 5MM X 12 FT

## (undated) DEVICE — SUT PROLENE 2-0 CT-2 30 IN 8411H

## (undated) DEVICE — ENDOPATH 5MM CURVED SCISSORS WITH MONOPOLAR CAUTERY: Brand: ENDOPATH

## (undated) DEVICE — NEEDLE 25G X 1 1/2

## (undated) DEVICE — BLUE HEAT SCOPE WARMER

## (undated) DEVICE — 2000CC GUARDIAN II: Brand: GUARDIAN

## (undated) DEVICE — ENDOPATH XCEL BLUNT TIP TROCARS WITH SMOOTH SLEEVES: Brand: ENDOPATH XCEL

## (undated) DEVICE — MEDI-VAC YANKAUER SUCTION HANDLE W/BULBOUS TIP: Brand: CARDINAL HEALTH

## (undated) DEVICE — TRAY FOLEY 16FR URIMETER SURESTEP

## (undated) DEVICE — STRL UNIVERSAL MINOR GENERAL: Brand: CARDINAL HEALTH

## (undated) DEVICE — HARMONIC HOOK 5 MM HDH05D

## (undated) DEVICE — ALLENTOWN LAP CHOLE APP PACK: Brand: CARDINAL HEALTH

## (undated) DEVICE — INTENDED FOR TISSUE SEPARATION, AND OTHER PROCEDURES THAT REQUIRE A SHARP SURGICAL BLADE TO PUNCTURE OR CUT.: Brand: BARD-PARKER SAFETY BLADES SIZE 11, STERILE

## (undated) DEVICE — GLOVE SRG BIOGEL 6.5

## (undated) DEVICE — ABSORBABLE WOUND CLOSURE DEVICE: Brand: V-LOC 90

## (undated) DEVICE — ENDOPATH 5MM ENDOSCOPIC BLUNT TIP DISSECTORS (12 POUCHES CONTAINING 3 DISSECTORS EACH): Brand: ENDOPATH

## (undated) DEVICE — SUT VICRYL 0 UR-6 27 IN J603H

## (undated) DEVICE — GLOVE SRG BIOGEL ECLIPSE 8

## (undated) DEVICE — DRAPE EQUIPMENT RF WAND

## (undated) DEVICE — CHLORAPREP HI-LITE 26ML ORANGE

## (undated) DEVICE — GLOVE INDICATOR PI UNDERGLOVE SZ 6.5 BLUE

## (undated) DEVICE — HARMONIC DISPOSABLE HAND SWITCHING ADAPTORS: Brand: HARMONIC

## (undated) DEVICE — CONMED ACCESSORY ELECTRODE, FLAT BLADE WITH EXTENDED INSULATION: Brand: CONMED

## (undated) DEVICE — GLOVE SRG BIOGEL 7

## (undated) DEVICE — SUT MONOCRYL 4-0 PS-2 27 IN Y426H

## (undated) DEVICE — INTENDED FOR TISSUE SEPARATION, AND OTHER PROCEDURES THAT REQUIRE A SHARP SURGICAL BLADE TO PUNCTURE OR CUT.: Brand: BARD-PARKER SAFETY BLADES SIZE 15, STERILE

## (undated) DEVICE — GLOVE INDICATOR PI UNDERGLOVE SZ 7.5 BLUE

## (undated) DEVICE — GLOVE INDICATOR PI UNDERGLOVE SZ 8 BLUE

## (undated) DEVICE — ADHESIVE SKN CLSR HISTOACRYL FLEX 0.5ML LF

## (undated) DEVICE — SUT VICRYL 3-0 SH 27 IN J416H

## (undated) DEVICE — SCD SEQUENTIAL COMPRESSION COMFORT SLEEVE MEDIUM KNEE LENGTH: Brand: KENDALL SCD

## (undated) DEVICE — VIAL DECANTER

## (undated) DEVICE — RELOADS HERNIA ENDO 4.0